# Patient Record
Sex: FEMALE | Race: WHITE | NOT HISPANIC OR LATINO
[De-identification: names, ages, dates, MRNs, and addresses within clinical notes are randomized per-mention and may not be internally consistent; named-entity substitution may affect disease eponyms.]

---

## 2017-03-06 ENCOUNTER — TRANSCRIPTION ENCOUNTER (OUTPATIENT)
Age: 42
End: 2017-03-06

## 2017-07-08 ENCOUNTER — TRANSCRIPTION ENCOUNTER (OUTPATIENT)
Age: 42
End: 2017-07-08

## 2021-03-30 ENCOUNTER — OUTPATIENT (OUTPATIENT)
Dept: OUTPATIENT SERVICES | Facility: HOSPITAL | Age: 46
LOS: 1 days | End: 2021-03-30
Payer: COMMERCIAL

## 2021-03-30 DIAGNOSIS — Z20.828 CONTACT WITH AND (SUSPECTED) EXPOSURE TO OTHER VIRAL COMMUNICABLE DISEASES: ICD-10-CM

## 2021-03-30 LAB — SARS-COV-2 RNA SPEC QL NAA+PROBE: SIGNIFICANT CHANGE UP

## 2021-03-30 PROCEDURE — U0003: CPT

## 2021-03-30 PROCEDURE — U0005: CPT

## 2021-03-30 PROCEDURE — C9803: CPT

## 2021-03-31 DIAGNOSIS — Z20.828 CONTACT WITH AND (SUSPECTED) EXPOSURE TO OTHER VIRAL COMMUNICABLE DISEASES: ICD-10-CM

## 2021-08-04 ENCOUNTER — TRANSCRIPTION ENCOUNTER (OUTPATIENT)
Age: 46
End: 2021-08-04

## 2021-11-01 ENCOUNTER — INPATIENT (INPATIENT)
Facility: HOSPITAL | Age: 46
LOS: 3 days | Discharge: HOME | DRG: 897 | End: 2021-11-05
Attending: INTERNAL MEDICINE | Admitting: INTERNAL MEDICINE
Payer: COMMERCIAL

## 2021-11-01 VITALS
SYSTOLIC BLOOD PRESSURE: 147 MMHG | TEMPERATURE: 98 F | RESPIRATION RATE: 20 BRPM | HEART RATE: 116 BPM | HEIGHT: 68 IN | DIASTOLIC BLOOD PRESSURE: 87 MMHG | WEIGHT: 134.92 LBS | OXYGEN SATURATION: 98 %

## 2021-11-01 DIAGNOSIS — F10.239 ALCOHOL DEPENDENCE WITH WITHDRAWAL, UNSPECIFIED: ICD-10-CM

## 2021-11-01 LAB
ALBUMIN SERPL ELPH-MCNC: 4 G/DL — SIGNIFICANT CHANGE UP (ref 3.3–5)
ALP SERPL-CCNC: 130 U/L — HIGH (ref 30–120)
ALT FLD-CCNC: 92 U/L DA — HIGH (ref 10–60)
ANION GAP SERPL CALC-SCNC: 15 MMOL/L — SIGNIFICANT CHANGE UP (ref 5–17)
ANISOCYTOSIS BLD QL: SLIGHT — SIGNIFICANT CHANGE UP
APTT BLD: 27.3 SEC — LOW (ref 27.5–35.5)
AST SERPL-CCNC: 184 U/L — HIGH (ref 10–40)
BASOPHILS # BLD AUTO: 0.05 K/UL — SIGNIFICANT CHANGE UP (ref 0–0.2)
BASOPHILS NFR BLD AUTO: 0.7 % — SIGNIFICANT CHANGE UP (ref 0–2)
BILIRUB SERPL-MCNC: 1.2 MG/DL — SIGNIFICANT CHANGE UP (ref 0.2–1.2)
BUN SERPL-MCNC: 11 MG/DL — SIGNIFICANT CHANGE UP (ref 7–23)
CALCIUM SERPL-MCNC: 8.8 MG/DL — SIGNIFICANT CHANGE UP (ref 8.4–10.5)
CHLORIDE SERPL-SCNC: 93 MMOL/L — LOW (ref 96–108)
CO2 SERPL-SCNC: 24 MMOL/L — SIGNIFICANT CHANGE UP (ref 22–31)
CREAT SERPL-MCNC: 0.62 MG/DL — SIGNIFICANT CHANGE UP (ref 0.5–1.3)
EOSINOPHIL # BLD AUTO: 0.06 K/UL — SIGNIFICANT CHANGE UP (ref 0–0.5)
EOSINOPHIL NFR BLD AUTO: 0.9 % — SIGNIFICANT CHANGE UP (ref 0–6)
ETHANOL SERPL-MCNC: <3 MG/DL — SIGNIFICANT CHANGE UP (ref 0–3)
GLUCOSE SERPL-MCNC: 125 MG/DL — HIGH (ref 70–99)
HCG SERPL-ACNC: <1 MIU/ML — SIGNIFICANT CHANGE UP
HCT VFR BLD CALC: 34.2 % — LOW (ref 34.5–45)
HGB BLD-MCNC: 11.6 G/DL — SIGNIFICANT CHANGE UP (ref 11.5–15.5)
IMM GRANULOCYTES NFR BLD AUTO: 0.4 % — SIGNIFICANT CHANGE UP (ref 0–1.5)
INR BLD: 1 RATIO — SIGNIFICANT CHANGE UP (ref 0.88–1.16)
LACTATE SERPL-SCNC: 0.5 MMOL/L — LOW (ref 0.7–2)
LIDOCAIN IGE QN: 254 U/L — SIGNIFICANT CHANGE UP (ref 73–393)
LYMPHOCYTES # BLD AUTO: 0.7 K/UL — LOW (ref 1–3.3)
LYMPHOCYTES # BLD AUTO: 10.2 % — LOW (ref 13–44)
MCHC RBC-ENTMCNC: 33.9 GM/DL — SIGNIFICANT CHANGE UP (ref 32–36)
MCHC RBC-ENTMCNC: 36.7 PG — HIGH (ref 27–34)
MCV RBC AUTO: 108.2 FL — HIGH (ref 80–100)
MONOCYTES # BLD AUTO: 0.73 K/UL — SIGNIFICANT CHANGE UP (ref 0–0.9)
MONOCYTES NFR BLD AUTO: 10.6 % — SIGNIFICANT CHANGE UP (ref 2–14)
NEUTROPHILS # BLD AUTO: 5.32 K/UL — SIGNIFICANT CHANGE UP (ref 1.8–7.4)
NEUTROPHILS NFR BLD AUTO: 77.2 % — HIGH (ref 43–77)
NRBC # BLD: 0 /100 WBCS — SIGNIFICANT CHANGE UP (ref 0–0)
PLAT MORPH BLD: NORMAL — SIGNIFICANT CHANGE UP
PLATELET # BLD AUTO: 63 K/UL — LOW (ref 150–400)
POLYCHROMASIA BLD QL SMEAR: SLIGHT — SIGNIFICANT CHANGE UP
POTASSIUM SERPL-MCNC: 2.9 MMOL/L — CRITICAL LOW (ref 3.5–5.3)
POTASSIUM SERPL-SCNC: 2.9 MMOL/L — CRITICAL LOW (ref 3.5–5.3)
PROT SERPL-MCNC: 8.3 G/DL — SIGNIFICANT CHANGE UP (ref 6–8.3)
PROTHROM AB SERPL-ACNC: 12.1 SEC — SIGNIFICANT CHANGE UP (ref 10.6–13.6)
RBC # BLD: 3.16 M/UL — LOW (ref 3.8–5.2)
RBC # FLD: 13 % — SIGNIFICANT CHANGE UP (ref 10.3–14.5)
RBC BLD AUTO: ABNORMAL
SARS-COV-2 RNA SPEC QL NAA+PROBE: SIGNIFICANT CHANGE UP
SODIUM SERPL-SCNC: 132 MMOL/L — LOW (ref 135–145)
STOMATOCYTES BLD QL SMEAR: SIGNIFICANT CHANGE UP
WBC # BLD: 6.89 K/UL — SIGNIFICANT CHANGE UP (ref 3.8–10.5)
WBC # FLD AUTO: 6.89 K/UL — SIGNIFICANT CHANGE UP (ref 3.8–10.5)

## 2021-11-01 PROCEDURE — 70450 CT HEAD/BRAIN W/O DYE: CPT | Mod: 26,MA

## 2021-11-01 PROCEDURE — 93010 ELECTROCARDIOGRAM REPORT: CPT

## 2021-11-01 PROCEDURE — 99285 EMERGENCY DEPT VISIT HI MDM: CPT

## 2021-11-01 PROCEDURE — 99223 1ST HOSP IP/OBS HIGH 75: CPT

## 2021-11-01 RX ORDER — METOPROLOL TARTRATE 50 MG
12.5 TABLET ORAL
Refills: 0 | Status: DISCONTINUED | OUTPATIENT
Start: 2021-11-02 | End: 2021-11-05

## 2021-11-01 RX ORDER — POTASSIUM PHOSPHATE, MONOBASIC POTASSIUM PHOSPHATE, DIBASIC 236; 224 MG/ML; MG/ML
30 INJECTION, SOLUTION INTRAVENOUS ONCE
Refills: 0 | Status: COMPLETED | OUTPATIENT
Start: 2021-11-02 | End: 2021-11-02

## 2021-11-01 RX ORDER — DIAZEPAM 5 MG
10 TABLET ORAL
Refills: 0 | Status: DISCONTINUED | OUTPATIENT
Start: 2021-11-01 | End: 2021-11-02

## 2021-11-01 RX ORDER — MAGNESIUM SULFATE 500 MG/ML
2 VIAL (ML) INJECTION ONCE
Refills: 0 | Status: COMPLETED | OUTPATIENT
Start: 2021-11-01 | End: 2021-11-01

## 2021-11-01 RX ORDER — DIAZEPAM 5 MG
10 TABLET ORAL ONCE
Refills: 0 | Status: DISCONTINUED | OUTPATIENT
Start: 2021-11-01 | End: 2021-11-01

## 2021-11-01 RX ORDER — ENOXAPARIN SODIUM 100 MG/ML
40 INJECTION SUBCUTANEOUS DAILY
Refills: 0 | Status: DISCONTINUED | OUTPATIENT
Start: 2021-11-01 | End: 2021-11-05

## 2021-11-01 RX ORDER — PANTOPRAZOLE SODIUM 20 MG/1
40 TABLET, DELAYED RELEASE ORAL DAILY
Refills: 0 | Status: DISCONTINUED | OUTPATIENT
Start: 2021-11-01 | End: 2021-11-05

## 2021-11-01 RX ORDER — CHLORHEXIDINE GLUCONATE 213 G/1000ML
1 SOLUTION TOPICAL
Refills: 0 | Status: DISCONTINUED | OUTPATIENT
Start: 2021-11-01 | End: 2021-11-03

## 2021-11-01 RX ORDER — DIAZEPAM 5 MG
10 TABLET ORAL EVERY 6 HOURS
Refills: 0 | Status: DISCONTINUED | OUTPATIENT
Start: 2021-11-02 | End: 2021-11-02

## 2021-11-01 RX ORDER — THIAMINE MONONITRATE (VIT B1) 100 MG
500 TABLET ORAL DAILY
Refills: 0 | Status: COMPLETED | OUTPATIENT
Start: 2021-11-01 | End: 2021-11-04

## 2021-11-01 RX ORDER — POTASSIUM CHLORIDE 20 MEQ
40 PACKET (EA) ORAL ONCE
Refills: 0 | Status: COMPLETED | OUTPATIENT
Start: 2021-11-01 | End: 2021-11-01

## 2021-11-01 RX ORDER — SODIUM CHLORIDE 9 MG/ML
1000 INJECTION INTRAMUSCULAR; INTRAVENOUS; SUBCUTANEOUS ONCE
Refills: 0 | Status: COMPLETED | OUTPATIENT
Start: 2021-11-01 | End: 2021-11-01

## 2021-11-01 RX ORDER — FOLIC ACID 0.8 MG
1 TABLET ORAL DAILY
Refills: 0 | Status: DISCONTINUED | OUTPATIENT
Start: 2021-11-01 | End: 2021-11-05

## 2021-11-01 RX ORDER — ESCITALOPRAM OXALATE 10 MG/1
5 TABLET, FILM COATED ORAL DAILY
Refills: 0 | Status: DISCONTINUED | OUTPATIENT
Start: 2021-11-02 | End: 2021-11-02

## 2021-11-01 RX ADMIN — Medication 1 MILLIGRAM(S): at 19:37

## 2021-11-01 RX ADMIN — Medication 1 MILLIGRAM(S): at 21:02

## 2021-11-01 RX ADMIN — SODIUM CHLORIDE 1000 MILLILITER(S): 9 INJECTION INTRAMUSCULAR; INTRAVENOUS; SUBCUTANEOUS at 20:37

## 2021-11-01 RX ADMIN — Medication 10 MILLIGRAM(S): at 22:54

## 2021-11-01 RX ADMIN — Medication 40 MILLIEQUIVALENT(S): at 21:02

## 2021-11-01 RX ADMIN — Medication 50 MILLIGRAM(S): at 19:37

## 2021-11-01 RX ADMIN — SODIUM CHLORIDE 1000 MILLILITER(S): 9 INJECTION INTRAMUSCULAR; INTRAVENOUS; SUBCUTANEOUS at 19:37

## 2021-11-01 NOTE — H&P ADULT - NSHPPHYSICALEXAM_GEN_ALL_CORE
-  Vital Signs Last 24 Hrs  T(C): 36.7 (02 Nov 2021 00:00), Max: 36.7 (01 Nov 2021 17:30)  T(F): 98 (02 Nov 2021 00:00), Max: 98 (01 Nov 2021 17:30)  HR: 99 (02 Nov 2021 00:00) (95 - 116)  BP: 105/83 (02 Nov 2021 00:00) (105/83 - 147/87)  BP(mean): 89 (02 Nov 2021 00:00) (89 - 89)  RR: 11 (02 Nov 2021 00:00) (11 - 22)  SpO2: 99% (02 Nov 2021 00:00) (98% - 99%)          PHYSICAL EXAM:  		  GENERAL: NAD, well-groomed, well-developed.  HEAD:  Atraumatic, Norm cephalic, small scalp swelling.  EYES: PERRLA, conjunctiva clear.  ENMT: no nasal discharge, MMM.   NECK: Supple, No JVD.  NERVOUS SYSTEM:  Alert & oriented X3, neurologically intact grossly, (+) marked B/L coarse hand tremors, even unable to deal with her cell phone.  CHEST/LUNG: Good air entry B/L, no rales, rhonchi, or wheezing.  HEART: Normal S1 & S2, no murmurs, or extra sounds.  ABDOMEN: Soft, non-tender, non-distended; bowel sounds present, no palpable masses or organomegaly, (+) scar of previpus surgery.  EXTREMITIES:  No clubbing, cyanosis, or edema.  VASCULAR: 2+ radial, DPA / PTA pulses B/L.  SKIN: No rashes or lesions.  PSYCH: normal affect & behavior.

## 2021-11-01 NOTE — ED ADULT NURSE NOTE - OBJECTIVE STATEMENT
Pt's  states he witnessed his wife fall to ground & had clenched her jaw & hands & had tremors. No hx of seizures. pt doesn't recall the event- thought she tripped & fell. Pt A & O

## 2021-11-01 NOTE — H&P ADULT - NSHPREVIEWOFSYSTEMS_GEN_ALL_CORE
-    CONSTITUTIONAL: No fever or chills.  EYES: No eye pain, visual disturbances, or discharge.  ENMT:  No difficulty hearing, vertigo, sinus or throat pain.  NECK: No pain or stiffness.	  RESPIRATORY: No cough, wheezing, or hemoptysis; No shortness of breath.  CARDIOVASCULAR: No chest pain, palpitations, dizziness, or leg swelling.  GASTROINTESTINAL: No abdominal pain, no nausea, vomiting, or hematemesis; No diarrhea or Change in bowel habits. No melena or hematochezia.  GENITOURINARY: No dysuria, frequency, hematuria, or incontinence.  NEUROLOGICAL: No headaches, focal muscle weakness, or numbness.  SKIN: No itching, burning or rashes.  MUSCULOSKELETAL: No joint swelling or pain.  PSYCHIATRIC: No depression, anxiety, or agitation.  HEME/LYMPH: No easy bruising, bleeding gums, or nose bleed.  ALLERGY AND IMMUNOLOGIC: No hives or eczema.
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
## 2021-11-01 NOTE — H&P ADULT - NSHPLABSRESULTS_GEN_ALL_CORE
-      Labs:    Lactate Trend  11-01 @ 19:33 Lactate:0.5                           11.6   6.89  )-----------( 63       ( 01 Nov 2021 20:04 )             34.2            11-01    132<L>  |  93<L>  |  11  ----------------------------<  125<H>  2.9<LL>   |  24  |  0.62    Ca    8.8      01 Nov 2021 20:00    TPro  8.3  /  Alb  4.0  /  TBili  1.2  /  DBili  x   /  AST  184<H>  /  ALT  92<H>  /  AlkPhos  130<H>  11-01            PT/INR - ( 01 Nov 2021 20:04 )   PT: 12.1 sec;   INR: 1.00 ratio         PTT - ( 01 Nov 2021 20:04 )  PTT:27.3 sec -    Lactate Trend  11-01 @ 19:33 Lactate:0.5                           11.6   6.89  )-----------( 63       ( 01 Nov 2021 20:04 )             34.2            11-01    132<L>  |  93<L>  |  11  ----------------------------<  125<H>  2.9<LL>   |  24  |  0.62    Ca    8.8      01 Nov 2021 20:00    TPro  8.3  /  Alb  4.0  /  TBili  1.2  /  DBili  x   /  AST  184<H>  /  ALT  92<H>  /  AlkPhos  130<H>  11-01            PT/INR - ( 01 Nov 2021 20:04 )   PT: 12.1 sec;   INR: 1.00 ratio    PTT - ( 01 Nov 2021 20:04 )  PTT:27.3 sec      COVID-19 PCR: NotDetec (01 Nov 2021 19:40)    Lipase, Serum (11.01.21 @ 20:00)   Lipase, Serum: 254 U/L     HCG Quantitative, Serum: <1.    Alcohol, Blood: <3: [employer] (mg/dL).          CT BRAIN:                      PROCEDURE DATE:  11/01/2021    INTERPRETATION:  Exam Date: 11/1/2021 8:26 PM  CT head without IV contrast  CLINICAL INFORMATION:  seizure  TECHNIQUE: Contiguous axial sections were obtained through the head.   Coronal and sagittal reformats were obtained.  COMPARISON: No previous examinations are available for review.  FINDINGS:  There is no evidence of intraparenchymal or extraaxial hemorrhage.   There is no CT evidence of large vessel acute infarct. No mass effect is found in the brain.  No evidence of midline shift or herniation pattern.  The ventricles, sulci and basal cisterns appear unremarkable.  Visualized paranasal sinuses are clear.  IMPRESSION:  No acute intracranial findings.  Personally reviewed by me.        EKG:    As per my review shows SR at 100/min, normal MT & prolonged QTc intervals, normal QRS voltage, duration, and axis (zero), with normal transition, no ST-T abnormality.      -

## 2021-11-01 NOTE — ED ADULT TRIAGE NOTE - CHIEF COMPLAINT QUOTE
As per , " We were at our son's soccer game - she had a seizure " Pt no known history of seizure Pt unable to recall seizure event stated that she tripped and fell . Pt BIBA Awake and verbally responsive

## 2021-11-01 NOTE — ED ADULT TRIAGE NOTE - CODE STROKE ACTIVE YN
----- Message from Katelynn Archer sent at 5/22/2020 10:03 AM CDT -----  Contact: pt  Please call back [phone]   No

## 2021-11-01 NOTE — ED PROVIDER NOTE - NS ED ROS FT

## 2021-11-01 NOTE — CONSULT NOTE ADULT - SUBJECTIVE AND OBJECTIVE BOX
REASON FOR CONSULT: Seizure, ETOHism    Chief Complaint    HPI: 45y/o female with PMH of HTN, depression/anxiety, etoh abuse (wine 3-5 drinks per day) presents to SY with seizure. She was at her sons football same when she collapsed and started shaking per her . Patient does not remember the event. This has never happened before. Admits to drinking 3-5 glasses of wine per day for at least 3 years. Started after her father got sick. No smoking or other drug use. No allergies.    Currently stable but tremulous.  Case discussed with the ICU PA    PAST MEDICAL & SURGICAL HISTORY:  HTN (hypertension)  HTN (hypertension)  Anxiety      Allergies  No Known Allergies      Medications:  diazepam  Injectable 10 milliGRAM(s) IV Push Once  diazepam  Injectable 10 milliGRAM(s) IV Push every 1 hour PRN  enoxaparin Injectable 40 milliGRAM(s) SubCutaneous daily  pantoprazole  Injectable 40 milliGRAM(s) IV Push daily  folic acid 1 milliGRAM(s) Oral daily  magnesium sulfate  IVPB 2 Gram(s) IV Intermittent Once  multivitamin 1 Tablet(s) Oral daily  potassium chloride    Tablet ER 40 milliEquivalent(s) Oral once  thiamine IVPB 500 milliGRAM(s) IV Intermittent daily  chlorhexidine 2% Cloths 1 Application(s) Topical <User Schedule>      Vital Signs Last 24 Hrs  T(C): 36.7 (01 Nov 2021 17:30), Max: 36.7 (01 Nov 2021 17:30)  T(F): 98 (01 Nov 2021 17:30), Max: 98 (01 Nov 2021 17:30)  HR: 99 (01 Nov 2021 21:17) (99 - 116)  BP: 127/77 (01 Nov 2021 21:17) (127/77 - 147/87)  RR: 16 (01 Nov 2021 21:17) (16 - 20)  SpO2: 98% (01 Nov 2021 21:17) (98% - 99%)        LABS:                        11.6   6.89  )-----------( 63       ( 01 Nov 2021 20:04 )             34.2     11-01    132<L>  |  93<L>  |  11  ----------------------------<  125<H>  2.9<LL>   |  24  |  0.62    Ca    8.8      01 Nov 2021 20:00    TPro  8.3  /  Alb  4.0  /  TBili  1.2  /  DBili  x   /  AST  184<H>  /  ALT  92<H>  /  AlkPhos  130<H>  11-01        PT/INR - ( 01 Nov 2021 20:04 )   PT: 12.1 sec;   INR: 1.00 ratio         PTT - ( 01 Nov 2021 20:04 )  PTT:27.3 sec      Physical Examination:  Physical exam as per bedside MD (direct physical examination could not be performed because the visit was provided via Telemedicine):       RADIOLOGY: < from: CT Head No Cont (11.01.21 @ 20:26) >  IMPRESSION:    No acute intracranial findings.    < end of copied text >      IMP-  Acute seizure  ETOH withdrawal    Plan-  Admit to ICU  CIWA protocol    CRITICAL CARE TIME SPENT: 40    This visit was provided via telemedicine. Patient was located at     [facility].  Provider was located at [facility] [place], [place] for the visit.

## 2021-11-01 NOTE — H&P ADULT - PROBLEM SELECTOR PLAN 1
new onset, r/o ETOH withdrawal, but no reported change in drinking pattern, no history of head trauma, CT head without contrast was unremarkable, admitted to [facility], currently on Benzodiazepines for ETOH detox, hold her Escitalopram, seizure precautions, neurology consult with Dr. Fregoso was called.

## 2021-11-01 NOTE — H&P ADULT - NSHPSOCIALHISTORY_GEN_ALL_CORE
-    , lives with family, working in spa, non smoker, drinks about 4 wine drinks every night, no drug abuse.

## 2021-11-01 NOTE — H&P ADULT - PROBLEM SELECTOR PLAN 4
mild, ML 2ry to Escitalopram use and ETOH abuse, unlikely to be the cause of her seizure given the level & lack of acuity, held Escitalopram already because of the seizureode, monitor sodium level. mild, ML 2ry to Escitalopram use and ETOH abuse, unlikely to be the cause of her seizure given the level & lack of acuity, held Escitalopram already because of the seizure episode, monitor sodium level.

## 2021-11-01 NOTE — H&P ADULT - PROBLEM SELECTOR PLAN 6
again related to ETOH abuse, no evidence suggestive of liver failure, will get an abdominal U/S in am.

## 2021-11-01 NOTE — H&P ADULT - PROBLEM SELECTOR PLAN 7
was started on LMWH 40 mg sub Q daily for DVT prophylaxis, also Pantoprazole 40 mg IVP daily for stress ulcer prophylaxis.

## 2021-11-01 NOTE — CONSULT NOTE ADULT - SUBJECTIVE AND OBJECTIVE BOX
Patient is a 46y old  Female who presents with a chief complaint of Seizure    HPI: 47y/o female with PMH of HTN, depression/anxiety, etoh abuse (wine 3-5 drinks per day) presents to SY with seizure. She was at her sons football same when she collapsed and started shaking per her . Patient does not remember the event. This has never happened before. Admits to drinking 3-5 glasses of wine per day for at least 3 years. Started after her father got sick. No smoking or other drug use. No allergies.      PAST MEDICAL & SURGICAL HISTORY:  HTN (hypertension)    HTN (hypertension)    Anxiety        Review of Systems:  CONSTITUTIONAL: No fever, chills, or fatigue  EYES: No eye pain, visual disturbances, or discharge  ENMT:  No difficulty hearing, tinnitus, vertigo; No sinus or throat pain  NECK: No pain or stiffness  RESPIRATORY: No cough, wheezing, chills or hemoptysis; No shortness of breath  CARDIOVASCULAR: No chest pain, palpitations, dizziness, or leg swelling  GASTROINTESTINAL: No abdominal or epigastric pain. No nausea, vomiting, or hematemesis; No diarrhea or constipation. No melena or hematochezia.  GENITOURINARY: No dysuria, frequency, hematuria, or incontinence  NEUROLOGICAL: No headaches, memory loss, loss of strength, numbness, + tremors  SKIN: No itching, burning, rashes, or lesions   MUSCULOSKELETAL: No joint pain or swelling; No muscle, back, or extremity pain  PSYCHIATRIC: No depression, anxiety, mood swings, or difficulty sleeping      Medications:        diazepam  Injectable 10 milliGRAM(s) IV Push Once  diazepam  Injectable 10 milliGRAM(s) IV Push every 1 hour PRN      enoxaparin Injectable 40 milliGRAM(s) SubCutaneous daily    pantoprazole  Injectable 40 milliGRAM(s) IV Push daily        folic acid 1 milliGRAM(s) Oral daily  magnesium sulfate  IVPB 2 Gram(s) IV Intermittent Once  multivitamin 1 Tablet(s) Oral daily  potassium chloride    Tablet ER 40 milliEquivalent(s) Oral once  thiamine IVPB 500 milliGRAM(s) IV Intermittent daily      chlorhexidine 2% Cloths 1 Application(s) Topical <User Schedule>            ICU Vital Signs Last 24 Hrs  T(C): 36.7 (01 Nov 2021 17:30), Max: 36.7 (01 Nov 2021 17:30)  T(F): 98 (01 Nov 2021 17:30), Max: 98 (01 Nov 2021 17:30)  HR: 99 (01 Nov 2021 21:17) (99 - 116)  BP: 127/77 (01 Nov 2021 21:17) (127/77 - 147/87)  BP(mean): --  ABP: --  ABP(mean): --  RR: 16 (01 Nov 2021 21:17) (16 - 20)  SpO2: 98% (01 Nov 2021 21:17) (98% - 99%)          I&O's Detail        LABS:                        11.6   6.89  )-----------( 63       ( 01 Nov 2021 20:04 )             34.2     11-01    132<L>  |  93<L>  |  11  ----------------------------<  125<H>  2.9<LL>   |  24  |  0.62    Ca    8.8      01 Nov 2021 20:00    TPro  8.3  /  Alb  4.0  /  TBili  1.2  /  DBili  x   /  AST  184<H>  /  ALT  92<H>  /  AlkPhos  130<H>  11-01          CAPILLARY BLOOD GLUCOSE        PT/INR - ( 01 Nov 2021 20:04 )   PT: 12.1 sec;   INR: 1.00 ratio         PTT - ( 01 Nov 2021 20:04 )  PTT:27.3 sec    CULTURES:      Physical Examination:    General: Alert, oriented, interactive, nonfocal, anxious, jittery     HEENT: Pupils equal, reactive to light.  Symmetric.    PULM: Clear to auscultation bilaterally, no significant sputum production    CVS: tachycardic rate and regular rhythm, no murmurs, rubs, or gallops    ABD: Soft, nondistended, nontender, normoactive bowel sounds    EXT: No edema, nontender, + hand tremors     SKIN: Warm and well perfused    NEURO: A&Ox3, strength 5/5 all extremities, cranial nerves grossly intact, no focal deficits      RADIOLOGY: EXAM:  CT BRAIN                                  PROCEDURE DATE:  11/01/2021          INTERPRETATION:  Exam Date: 11/1/2021 8:26 PM    CT head without IV contrast    CLINICAL INFORMATION:  seizure    TECHNIQUE: Contiguous axial sections were obtained through the head.   Coronal and sagittal reformats were obtained.    COMPARISON: No previous examinations are available for review.    FINDINGS:    There is no evidence of intraparenchymal or extraaxial hemorrhage.   There is no CT evidence of large vessel acute infarct. No mass effect is found in the brain.  No evidence of midline shift or herniation pattern.    The ventricles, sulci and basal cisterns appear unremarkable.    Visualized paranasal sinuses are clear.    IMPRESSION:    No acute intracranial findings.    --- End of Report ---        SONI HILL MD; Attending Radiologist  This document has been electronically signed. Nov 1 2021  8:27PM

## 2021-11-01 NOTE — ED PROVIDER NOTE - PHYSICAL EXAMINATION
Gen: Alert, NAD +hand tremors  Head/eyes: NC/AT, PERRL, EOMI, normal lids/conjunctiva, no scleral icterus  ENT: airway patent + tongue fasciculations, no tongue bite,   Neck: supple, no tenderness/meningismus/JVD, Trachea midline  Pulm/lung: Bilateral clear BS, normal resp effort, no wheeze/stridor/retractions  CV/heart: RRR, no M/R/G, +2 dist pulses (radial, pedal DP/PT, popliteal)  GI/Abd: soft, NT/ND, +BS, no guarding/rebound tenderness  Musculoskeletal: no edema/erythema/cyanosis, FROM in all extremities, no C/T/L spine TTP   Skin: no rash, no vesicles, no petechaie, no ecchymosis, no swelling  Neuro: AAOx3, CN 2-12 intact, normal sensation, 5/5 motor strength in all extremities, normal gait, no dysmetria

## 2021-11-01 NOTE — ED PROVIDER NOTE - CLINICAL SUMMARY MEDICAL DECISION MAKING FREE TEXT BOX
First time seizure today w/ hx of everyday alcohol use, suspect alcohol withdrawal seizures. will check lab CT head IV benzo and admit.

## 2021-11-01 NOTE — CONSULT NOTE ADULT - ASSESSMENT
47y/o female with PMH of HTN, depression/anxiety, etoh abuse (wine 3-5 drinks per day) presents to SY with seizure. Most likely etoh withdrawal related. Patient wants to go through detox. Will admit to SPCU for detox given she has already had a seizure and will need aggressive benzo and possible phenobarb therapy.     Plan discussed with E- ICU attending Dr. Salas    Problem List:  1) Etoh withdrawal with DTs and seizure  2) Hypokalemia    Admit to SPCU  Start on valium for withdrawal and detox, got 2mg ativan IV in ER, will use valium 10mg IVP now followed by q6 hours. Valium 10mg IVP PRn CIWA > 8. Assess response and dose more frequently if needed.   Patient is interested in dextox and getting help afterwards  Replace potassium with 40meq now, also give 30mmol K phos and 2g magnesium  Place on MVI, thiamine, and folic acid, high dose thiamine daily for 3 days  Regular diet, encourage PO intake   Given her length and quanity of alcohol intake suspect she will need aggressive treatment especially as time goes on in the next day or so, will be liberal with valium dosing, it is better to load these patient's upfront then to try to catch up later  Valium 10mg IVP now to control symptoms  Precedex if needed as well

## 2021-11-01 NOTE — ED ADULT NURSE NOTE - NSIMPLEMENTINTERV_GEN_ALL_ED
Implemented All Fall with Harm Risk Interventions:  [place] to call system. Call bell, personal items and telephone within reach. Instruct patient to call for assistance. Room bathroom lighting operational. Non-slip footwear when patient is off stretcher. Physically safe environment: no spills, clutter or unnecessary equipment. Stretcher in lowest position, wheels locked, appropriate side rails in place. Provide visual cue, wrist band, yellow gown, etc. Monitor gait and stability. Monitor for mental status changes and reorient to person, place, and time. Review medications for side effects contributing to fall risk. Reinforce activity limits and safety measures with patient and family. Provide visual clues: red socks.

## 2021-11-01 NOTE — H&P ADULT - HISTORY OF PRESENT ILLNESS
This is a 45 y/o F with PMH of HTN, ETOH abuse, and Anxiety who presented with a seizure episode witnessed by her  who reported seeing her falling to the ground with her jaw clenched, and hands shacking, then she recovered spontaneously after 5 min, no vomiting, or incontinence during the episode. Patient stated that she was "looking at the arnaud" and fell down backwards hitting her head. No history of seizures in the past. Patient with history of ETOH abuse, drinking every night, with her last drink was last night. On arrival to ED she was AAO X3, but with marked tremors, received Diazepam 10 mg IVP X1 dose, Ativan 1 mg IVP X2 doses, and Chlordiazepoxide 50 mg PO X1 dose, and was admitted to SPCU for ETOH detox.

## 2021-11-01 NOTE — H&P ADULT - PROBLEM SELECTOR PLAN 2
requested ETOH detox, admitted to SPCU, was started on CIWA protocol with IVP diazepam, CC/addiction medicine consult with Dr. Akhtar was called. requested ETOH detox, admitted to SPCU, was started on CIWA protocol with IVP diazepam, thiamin & folic acid, monitor magnesium & phosphorous, CC/addiction medicine consult with Dr. Akhtar was called.

## 2021-11-01 NOTE — H&P ADULT - PROBLEM SELECTOR PLAN 5
Ml related to ETOH abuse, patient is willing to quit and requested detox, was started on Folic acid supplementation, monitor.

## 2021-11-02 DIAGNOSIS — I10 ESSENTIAL (PRIMARY) HYPERTENSION: ICD-10-CM

## 2021-11-02 DIAGNOSIS — E87.1 HYPO-OSMOLALITY AND HYPONATREMIA: ICD-10-CM

## 2021-11-02 DIAGNOSIS — R94.5 ABNORMAL RESULTS OF LIVER FUNCTION STUDIES: ICD-10-CM

## 2021-11-02 DIAGNOSIS — D75.89 OTHER SPECIFIED DISEASES OF BLOOD AND BLOOD-FORMING ORGANS: ICD-10-CM

## 2021-11-02 DIAGNOSIS — R56.9 UNSPECIFIED CONVULSIONS: ICD-10-CM

## 2021-11-02 DIAGNOSIS — E87.6 HYPOKALEMIA: ICD-10-CM

## 2021-11-02 DIAGNOSIS — Z29.9 ENCOUNTER FOR PROPHYLACTIC MEASURES, UNSPECIFIED: ICD-10-CM

## 2021-11-02 DIAGNOSIS — F10.10 ALCOHOL ABUSE, UNCOMPLICATED: ICD-10-CM

## 2021-11-02 DIAGNOSIS — Z98.891 HISTORY OF UTERINE SCAR FROM PREVIOUS SURGERY: Chronic | ICD-10-CM

## 2021-11-02 DIAGNOSIS — R19.7 DIARRHEA, UNSPECIFIED: ICD-10-CM

## 2021-11-02 LAB
ALBUMIN SERPL ELPH-MCNC: 3.1 G/DL — LOW (ref 3.3–5)
ALP SERPL-CCNC: 102 U/L — SIGNIFICANT CHANGE UP (ref 30–120)
ALT FLD-CCNC: 68 U/L DA — HIGH (ref 10–60)
AMMONIA BLD-MCNC: 43 UMOL/L — HIGH (ref 11–32)
ANION GAP SERPL CALC-SCNC: 8 MMOL/L — SIGNIFICANT CHANGE UP (ref 5–17)
AST SERPL-CCNC: 124 U/L — HIGH (ref 10–40)
BASOPHILS # BLD AUTO: 0.03 K/UL — SIGNIFICANT CHANGE UP (ref 0–0.2)
BASOPHILS NFR BLD AUTO: 0.5 % — SIGNIFICANT CHANGE UP (ref 0–2)
BILIRUB SERPL-MCNC: 1 MG/DL — SIGNIFICANT CHANGE UP (ref 0.2–1.2)
BUN SERPL-MCNC: 7 MG/DL — SIGNIFICANT CHANGE UP (ref 7–23)
CALCIUM SERPL-MCNC: 8.3 MG/DL — LOW (ref 8.4–10.5)
CHLORIDE SERPL-SCNC: 101 MMOL/L — SIGNIFICANT CHANGE UP (ref 96–108)
CK SERPL-CCNC: 136 U/L — SIGNIFICANT CHANGE UP (ref 26–192)
CO2 SERPL-SCNC: 25 MMOL/L — SIGNIFICANT CHANGE UP (ref 22–31)
COVID-19 SPIKE DOMAIN AB INTERP: NEGATIVE — SIGNIFICANT CHANGE UP
COVID-19 SPIKE DOMAIN ANTIBODY RESULT: 0.4 U/ML — SIGNIFICANT CHANGE UP
CREAT SERPL-MCNC: 0.51 MG/DL — SIGNIFICANT CHANGE UP (ref 0.5–1.3)
EOSINOPHIL # BLD AUTO: 0.08 K/UL — SIGNIFICANT CHANGE UP (ref 0–0.5)
EOSINOPHIL NFR BLD AUTO: 1.4 % — SIGNIFICANT CHANGE UP (ref 0–6)
GLUCOSE SERPL-MCNC: 94 MG/DL — SIGNIFICANT CHANGE UP (ref 70–99)
HCT VFR BLD CALC: 32.1 % — LOW (ref 34.5–45)
HGB BLD-MCNC: 10.8 G/DL — LOW (ref 11.5–15.5)
IMM GRANULOCYTES NFR BLD AUTO: 0.4 % — SIGNIFICANT CHANGE UP (ref 0–1.5)
LYMPHOCYTES # BLD AUTO: 0.81 K/UL — LOW (ref 1–3.3)
LYMPHOCYTES # BLD AUTO: 14.5 % — SIGNIFICANT CHANGE UP (ref 13–44)
MAGNESIUM SERPL-MCNC: 2 MG/DL — SIGNIFICANT CHANGE UP (ref 1.6–2.6)
MCHC RBC-ENTMCNC: 33.6 GM/DL — SIGNIFICANT CHANGE UP (ref 32–36)
MCHC RBC-ENTMCNC: 37.2 PG — HIGH (ref 27–34)
MCV RBC AUTO: 110.7 FL — HIGH (ref 80–100)
MONOCYTES # BLD AUTO: 0.66 K/UL — SIGNIFICANT CHANGE UP (ref 0–0.9)
MONOCYTES NFR BLD AUTO: 11.8 % — SIGNIFICANT CHANGE UP (ref 2–14)
NEUTROPHILS # BLD AUTO: 3.97 K/UL — SIGNIFICANT CHANGE UP (ref 1.8–7.4)
NEUTROPHILS NFR BLD AUTO: 71.4 % — SIGNIFICANT CHANGE UP (ref 43–77)
NRBC # BLD: 0 /100 WBCS — SIGNIFICANT CHANGE UP (ref 0–0)
PHOSPHATE SERPL-MCNC: 4.4 MG/DL — SIGNIFICANT CHANGE UP (ref 2.5–4.5)
PLATELET # BLD AUTO: 61 K/UL — LOW (ref 150–400)
POTASSIUM SERPL-MCNC: 4 MMOL/L — SIGNIFICANT CHANGE UP (ref 3.5–5.3)
POTASSIUM SERPL-SCNC: 4 MMOL/L — SIGNIFICANT CHANGE UP (ref 3.5–5.3)
PROT SERPL-MCNC: 6.9 G/DL — SIGNIFICANT CHANGE UP (ref 6–8.3)
RBC # BLD: 2.9 M/UL — LOW (ref 3.8–5.2)
RBC # FLD: 13.2 % — SIGNIFICANT CHANGE UP (ref 10.3–14.5)
SARS-COV-2 IGG+IGM SERPL QL IA: 0.4 U/ML — SIGNIFICANT CHANGE UP
SARS-COV-2 IGG+IGM SERPL QL IA: NEGATIVE — SIGNIFICANT CHANGE UP
SODIUM SERPL-SCNC: 134 MMOL/L — LOW (ref 135–145)
TSH SERPL-MCNC: 3.14 UIU/ML — SIGNIFICANT CHANGE UP (ref 0.27–4.2)
VIT B12 SERPL-MCNC: 1752 PG/ML — HIGH (ref 232–1245)
WBC # BLD: 5.57 K/UL — SIGNIFICANT CHANGE UP (ref 3.8–10.5)
WBC # FLD AUTO: 5.57 K/UL — SIGNIFICANT CHANGE UP (ref 3.8–10.5)

## 2021-11-02 PROCEDURE — 99233 SBSQ HOSP IP/OBS HIGH 50: CPT

## 2021-11-02 PROCEDURE — 76700 US EXAM ABDOM COMPLETE: CPT | Mod: 26

## 2021-11-02 PROCEDURE — 99231 SBSQ HOSP IP/OBS SF/LOW 25: CPT

## 2021-11-02 RX ORDER — DIAZEPAM 5 MG
5 TABLET ORAL EVERY 4 HOURS
Refills: 0 | Status: DISCONTINUED | OUTPATIENT
Start: 2021-11-02 | End: 2021-11-05

## 2021-11-02 RX ORDER — METOPROLOL TARTRATE 50 MG
25 TABLET ORAL
Qty: 0 | Refills: 0 | DISCHARGE

## 2021-11-02 RX ORDER — SERTRALINE 25 MG/1
50 TABLET, FILM COATED ORAL DAILY
Refills: 0 | Status: DISCONTINUED | OUTPATIENT
Start: 2021-11-02 | End: 2021-11-05

## 2021-11-02 RX ORDER — SERTRALINE 25 MG/1
1 TABLET, FILM COATED ORAL
Qty: 0 | Refills: 0 | DISCHARGE

## 2021-11-02 RX ORDER — DIAZEPAM 5 MG
5 TABLET ORAL EVERY 4 HOURS
Refills: 0 | Status: DISCONTINUED | OUTPATIENT
Start: 2021-11-02 | End: 2021-11-04

## 2021-11-02 RX ADMIN — Medication 5 MILLIGRAM(S): at 22:03

## 2021-11-02 RX ADMIN — Medication 1 MILLIGRAM(S): at 00:03

## 2021-11-02 RX ADMIN — ENOXAPARIN SODIUM 40 MILLIGRAM(S): 100 INJECTION SUBCUTANEOUS at 11:09

## 2021-11-02 RX ADMIN — Medication 12.5 MILLIGRAM(S): at 17:50

## 2021-11-02 RX ADMIN — Medication 5 MILLIGRAM(S): at 11:08

## 2021-11-02 RX ADMIN — PANTOPRAZOLE SODIUM 40 MILLIGRAM(S): 20 TABLET, DELAYED RELEASE ORAL at 11:09

## 2021-11-02 RX ADMIN — Medication 12.5 MILLIGRAM(S): at 06:02

## 2021-11-02 RX ADMIN — Medication 1 TABLET(S): at 11:09

## 2021-11-02 RX ADMIN — POTASSIUM PHOSPHATE, MONOBASIC POTASSIUM PHOSPHATE, DIBASIC 83.33 MILLIMOLE(S): 236; 224 INJECTION, SOLUTION INTRAVENOUS at 00:54

## 2021-11-02 RX ADMIN — Medication 1 TABLET(S): at 00:04

## 2021-11-02 RX ADMIN — ENOXAPARIN SODIUM 40 MILLIGRAM(S): 100 INJECTION SUBCUTANEOUS at 00:04

## 2021-11-02 RX ADMIN — Medication 50 GRAM(S): at 00:04

## 2021-11-02 RX ADMIN — Medication 40 MILLIEQUIVALENT(S): at 00:04

## 2021-11-02 RX ADMIN — Medication 5 MILLIGRAM(S): at 17:50

## 2021-11-02 RX ADMIN — Medication 10 MILLIGRAM(S): at 04:10

## 2021-11-02 RX ADMIN — Medication 5 MILLIGRAM(S): at 14:14

## 2021-11-02 RX ADMIN — Medication 10 MILLIGRAM(S): at 00:54

## 2021-11-02 RX ADMIN — Medication 105 MILLIGRAM(S): at 11:09

## 2021-11-02 RX ADMIN — Medication 1 MILLIGRAM(S): at 11:08

## 2021-11-02 RX ADMIN — CHLORHEXIDINE GLUCONATE 1 APPLICATION(S): 213 SOLUTION TOPICAL at 06:03

## 2021-11-02 RX ADMIN — Medication 10 MILLIGRAM(S): at 06:02

## 2021-11-02 NOTE — PROGRESS NOTE ADULT - ASSESSMENT
45 y/o F w/ a PMHx of HTN, depression/anxiety, and EtOH abuse (3-5 glasses of wine/day x 3 years) admitted w/:    1. EtOH withdrawal/DTs  2. Seizures  3. Hypokalemia, improved  4. Transaminitis    PLAN:  - No further seizure activity. Ativan IV PRN for seizures.  - Multivitamin, folic acid, and thiamine.  - Valium 5mg PO q4 hrs plus 5mg q4 hrs PRN for CIWA > 5 per addiction medicine attending recs.  - Transaminitis downtrending.  - Abdominal ultrasound revealing of fatty liver.  - Chemical DVT prophylaxis w/ lovenox.

## 2021-11-02 NOTE — BH CONSULTATION LIAISON ASSESSMENT NOTE - NSCDBILL_PSY_A_CORE
January 20, 2020      To Whom It May Concern:      Cathy Valadez Sukhialeshia was seen in our Emergency Department today, 01/20/20.    Sincerely,        Bipin Sykes, DO        
[id number] - Inpatient, moderate complexity

## 2021-11-02 NOTE — CONSULT NOTE ADULT - CONSULT REASON
etoh withdrawal seizure
Seizure, ETOH withdrawal.
Seizure  Alcohol withdrawal
etoh use  AMOR  seizures

## 2021-11-02 NOTE — PROGRESS NOTE ADULT - SUBJECTIVE AND OBJECTIVE BOX
Patient is a 45 y/o female who presents with a chief complaint of seizures (2021 20:00)    BRIEF HOSPITAL COURSE: 45 y/o F w/ a PMHx of HTN, depression/anxiety, and EtOH abuse (3-5 glasses of wine/day x 3 years) who presented on  s/p witnessed seizure at son's football game. Admitted to [facility] w/ severe EtOH withdrawal/DTs.    Events last 24 hours: No further seizure activity. Remains slightly tremulous. CIWA score is 4.     PAST MEDICAL & SURGICAL HISTORY:  HTN (hypertension)  HTN (hypertension)  Anxiety  H/O  section    Review of Systems:  CONSTITUTIONAL: No fever, chills, or fatigue.  EYES: No eye pain, visual disturbances, or discharge.  ENMT:  No difficulty hearing, tinnitus, or vertigo. No sinus or throat pain.  NECK: No pain or stiffness.  RESPIRATORY: No shortness of breath, cough, or wheezing.  CARDIOVASCULAR: No chest pain, palpitations, dizziness, or leg swelling.  GASTROINTESTINAL: No abdominal or epigastric pain. No nausea, vomiting, diarrhea, or constipation. No hematemesis, melena, or hematochezia.  GENITOURINARY: No dysuria, increased frequency, hematuria, or incontinence.  NEUROLOGICAL: No headaches, memory loss, loss of strength, numbness, or tremors.  SKIN: No itching, burning, rashes, or lesions.  MUSCULOSKELETAL: No joint pain or swelling. No muscle, back, or extremity pain.  PSYCHIATRIC: No depression, anxiety, mood swings, or difficulty sleeping.    Medications:  metoprolol tartrate 12.5 milliGRAM(s) Oral two times a day  diazepam    Tablet 5 milliGRAM(s) Oral every 4 hours  diazepam    Tablet 5 milliGRAM(s) Oral every 4 hours PRN  LORazepam   Injectable 2 milliGRAM(s) IV Push every 15 minutes PRN  sertraline 50 milliGRAM(s) Oral daily  enoxaparin Injectable 40 milliGRAM(s) SubCutaneous daily  pantoprazole  Injectable 40 milliGRAM(s) IV Push daily  folic acid 1 milliGRAM(s) Oral daily  multivitamin 1 Tablet(s) Oral daily  thiamine IVPB 500 milliGRAM(s) IV Intermittent daily  chlorhexidine 2% Cloths 1 Application(s) Topical <User Schedule>    ICU Vital Signs Last 24 Hrs  T(C): 36.6 (2021 00:50), Max: 36.8 (2021 06:00)  T(F): 97.8 (2021 00:50), Max: 98.2 (2021 06:00)  HR: 79 (2021 00:50) (77 - 94)  BP: 122/87 (2021 00:50) (96/76 - 153/89)  BP(mean): 97 (2021 00:50) (80 - 130)  ABP: --  ABP(mean): --  RR: 14 (2021 00:50) (14 - 20)  SpO2: 97% (2021 00:50) (95% - 100%)    I&O's Detail    2021 07:01  -  2021 02:02  --------------------------------------------------------  IN:    Oral Fluid: 600 mL  Total IN: 600 mL    OUT:    Voided (mL): 600 mL  Total OUT: 600 mL    Total NET: 0 mL    LABS:                        10.8   5.57  )-----------( 61       ( 2021 12:47 )             32.1     11-02    134<L>  |  101  |  7   ----------------------------<  94  4.0   |  25  |  0.51    Ca    8.3<L>      2021 12:47  Phos  4.4     11-02  Mg     2.0     11-02    TPro  6.9  /  Alb  3.1<L>  /  TBili  1.0  /  DBili  x   /  AST  124<H>  /  ALT  68<H>  /  AlkPhos  102  11-02    CARDIAC MARKERS ( 2021 12:47 )  x     / x     / 136 U/L / x     / x        CAPILLARY BLOOD GLUCOSE    PT/INR - ( 2021 20:04 )   PT: 12.1 sec;   INR: 1.00 ratio    PTT - ( 2021 20:04 )  PTT:27.3 sec    CULTURES:    Physical Examination:    General: Resting comfortably in bed.    HEENT: Pupils equal, reactive to light. Symmetric. No scleral icterus or injection.    PULM: Clear to auscultation b/l.    NECK: Supple, no lymphadenopathy, trachea midline.    CVS: Regular rate and rhythm, no murmurs appreciated, +s1/s2.    ABD: Soft, nondistended, nontender, normoactive bowel sounds.    EXT: No edema, nontender.    SKIN: Warm and well perfused, no rashes noted.    NEURO: Alert, oriented, interactive, nonfocal.    RADIOLOGY:  < from: CT Head No Cont (21 @ 20:26) >  EXAM:  CT BRAIN                          PROCEDURE DATE:  2021      INTERPRETATION:  Exam Date: 2021 8:26 PM    CT head without IV contrast    CLINICAL INFORMATION:  seizure    TECHNIQUE: Contiguous axial sections were obtained through the head.   Coronal and sagittal reformats were obtained.    COMPARISON: No previous examinations are available for review.    FINDINGS:    There is no evidence of intraparenchymal or extraaxial hemorrhage.   There is no CT evidence of large vessel acute infarct. No mass effect is found in the brain.  No evidence of midline shift or herniation pattern.    The ventricles, sulci and basal cisterns appear unremarkable.    Visualized paranasal sinuses are clear.    IMPRESSION:    No acute intracranial findings.    --- End of Report ---    SONI HILL MD; Attending Radiologist  This document has been electronically signed. 2021  8:27PM    < end of copied text >

## 2021-11-02 NOTE — PROGRESS NOTE ADULT - PROBLEM SELECTOR PLAN 7
was started on LMWH 40 mg sub Q daily for DVT prophylaxis, also Pantoprazole 40 mg IVP daily for stress ulcer prophylaxis. Lovenox 40mg qd Abd us with hepatic steatosis Continue vitamin supplementation  Trend cbc

## 2021-11-02 NOTE — PROGRESS NOTE ADULT - SUBJECTIVE AND OBJECTIVE BOX
Patient is a 46y old  Female who presents with a chief complaint of Seizure. (02 Nov 2021 11:52)      INTERVAL HPI/OVERNIGHT EVENTS: Patient seen and examined at bedside. No overnight events.       MEDICATIONS  (STANDING):  chlorhexidine 2% Cloths 1 Application(s) Topical <User Schedule>  diazepam    Tablet 5 milliGRAM(s) Oral every 4 hours  enoxaparin Injectable 40 milliGRAM(s) SubCutaneous daily  folic acid 1 milliGRAM(s) Oral daily  metoprolol tartrate 12.5 milliGRAM(s) Oral two times a day  multivitamin 1 Tablet(s) Oral daily  pantoprazole  Injectable 40 milliGRAM(s) IV Push daily  thiamine IVPB 500 milliGRAM(s) IV Intermittent daily    MEDICATIONS  (PRN):  diazepam    Tablet 5 milliGRAM(s) Oral every 4 hours PRN for ciwa > 5  LORazepam   Injectable 2 milliGRAM(s) IV Push every 15 minutes PRN for ciwa > 10 or Seizure activity      Allergies    No Known Allergies    Intolerances        REVIEW OF SYSTEMS:  CONSTITUTIONAL: No fever or chills  HEENT:  No headache, no sore throat  RESPIRATORY: No cough, wheezing, or shortness of breath  CARDIOVASCULAR: No chest pain, palpitations, or leg swelling  GASTROINTESTINAL: No abd pain, nausea, vomiting, or diarrhea  GENITOURINARY: No dysuria, frequency, or hematuria  NEUROLOGICAL: no focal weakness or dizziness  MUSCULOSKELETAL: no myalgias     Vital Signs Last 24 Hrs  T(C): 36.2 (02 Nov 2021 08:00), Max: 36.8 (02 Nov 2021 06:00)  T(F): 97.2 (02 Nov 2021 08:00), Max: 98.2 (02 Nov 2021 06:00)  HR: 77 (02 Nov 2021 08:00) (77 - 116)  BP: 153/89 (02 Nov 2021 08:00) (105/83 - 153/89)  BP(mean): 110 (02 Nov 2021 08:00) (89 - 110)  RR: 15 (02 Nov 2021 08:00) (11 - 22)  SpO2: 96% (02 Nov 2021 08:00) (96% - 100%)    PHYSICAL EXAM:  GENERAL: NAD  HEENT:  EOMI, moist mucous membranes  CHEST/LUNG:  CTA b/l, no rales, wheezes, or rhonchi  HEART:  RRR, S1, S2  ABDOMEN:  BS+, soft, nontender, nondistended  EXTREMITIES: (+) hand tremors. No edema, cyanosis, or calf tenderness  NERVOUS SYSTEM: AA&Ox3    LABS:                        10.8   5.57  )-----------( 61       ( 02 Nov 2021 12:47 )             32.1     CBC Full  -  ( 02 Nov 2021 12:47 )  WBC Count : 5.57 K/uL  Hemoglobin : 10.8 g/dL  Hematocrit : 32.1 %  Platelet Count - Automated : 61 K/uL  Mean Cell Volume : 110.7 fl  Mean Cell Hemoglobin : 37.2 pg  Mean Cell Hemoglobin Concentration : 33.6 gm/dL  Auto Neutrophil # : 3.97 K/uL  Auto Lymphocyte # : 0.81 K/uL  Auto Monocyte # : 0.66 K/uL  Auto Eosinophil # : 0.08 K/uL  Auto Basophil # : 0.03 K/uL  Auto Neutrophil % : 71.4 %  Auto Lymphocyte % : 14.5 %  Auto Monocyte % : 11.8 %  Auto Eosinophil % : 1.4 %  Auto Basophil % : 0.5 %    02 Nov 2021 12:47    134    |  101    |  7      ----------------------------<  94     4.0     |  25     |  0.51     Ca    8.3        02 Nov 2021 12:47  Phos  4.4       02 Nov 2021 12:47  Mg     2.0       02 Nov 2021 12:47    TPro  6.9    /  Alb  3.1    /  TBili  1.0    /  DBili  x      /  AST  124    /  ALT  68     /  AlkPhos  102    02 Nov 2021 12:47    PT/INR - ( 01 Nov 2021 20:04 )   PT: 12.1 sec;   INR: 1.00 ratio         PTT - ( 01 Nov 2021 20:04 )  PTT:27.3 sec    CAPILLARY BLOOD GLUCOSE              RADIOLOGY & ADDITIONAL TESTS: < from: US Abdomen Complete (US Abdomen Complete .) (11.02.21 @ 09:10) >  EXAM:  US ABDOMEN COMPLETE                                  PROCEDURE DATE:  11/02/2021          INTERPRETATION:  CLINICAL INFORMATION:    COMPARISON: None available.    TECHNIQUE: Sonography of the abdomen.    FINDINGS:    Liver: Steatosis  Bile ducts: Normal caliber. Common bile duct measures 0.5 cm.  Gallbladder: Within normal limits.  Pancreas: Visualized portions are within normal limits.  Spleen: 11.2 cm. Within normal limits.  Right kidney: 11.6 cm. No hydronephrosis.  Left kidney: 11.9 cm.  No hydronephrosis.  Ascites: None.  Aorta and IVC: Visualized portions are within normal limits.    IMPRESSION:  Fatty liver. Otherwise unremarkable study.        --- End of Report ---              CALDERON LIMON MD; Attending Radiologist  This document has been electronically signed. Nov 2 2021  9:48AM    < end of copied text >    < from: CT Head No Cont (11.01.21 @ 20:26) >    EXAM:  CT BRAIN                                  PROCEDURE DATE:  11/01/2021          INTERPRETATION:  Exam Date: 11/1/2021 8:26 PM    CT head without IV contrast    CLINICAL INFORMATION:  seizure    TECHNIQUE: Contiguous axial sections were obtained through the head.   Coronal and sagittal reformats were obtained.    COMPARISON: No previous examinations are available for review.    FINDINGS:    There is no evidence of intraparenchymal or extraaxial hemorrhage.   There is no CT evidence of large vessel acute infarct. No mass effect is found in the brain.  No evidence of midline shift or herniation pattern.    The ventricles, sulci and basal cisterns appear unremarkable.    Visualized paranasal sinuses are clear.    IMPRESSION:    No acute intracranial findings.    --- End of Report ---              SONI HILL MD; Attending Radiologist  This document has been electronically signed. Nov 1 2021  8:27PM    < end of copied text >        Consultant(s) Notes Reviewed:  [x] YES  [ ] NO    Care Discussed with [x] Consultants  [x] Patient  [ ] Family  [ ]      [ x] Other; RN  DVT ppx   Patient is a 46y old  Female who presents with a chief complaint of Seizure. (02 Nov 2021 11:52)      INTERVAL HPI/OVERNIGHT EVENTS: Patient seen and examined at bedside. No overnight events.       MEDICATIONS  (STANDING):  chlorhexidine 2% Cloths 1 Application(s) Topical <User Schedule>  diazepam    Tablet 5 milliGRAM(s) Oral every 4 hours  enoxaparin Injectable 40 milliGRAM(s) SubCutaneous daily  folic acid 1 milliGRAM(s) Oral daily  metoprolol tartrate 12.5 milliGRAM(s) Oral two times a day  multivitamin 1 Tablet(s) Oral daily  pantoprazole  Injectable 40 milliGRAM(s) IV Push daily  thiamine IVPB 500 milliGRAM(s) IV Intermittent daily    MEDICATIONS  (PRN):  diazepam    Tablet 5 milliGRAM(s) Oral every 4 hours PRN for ciwa > 5  LORazepam   Injectable 2 milliGRAM(s) IV Push every 15 minutes PRN for ciwa > 10 or Seizure activity      Allergies    No Known Allergies    Intolerances        REVIEW OF SYSTEMS:  CONSTITUTIONAL: No fever or chills  HEENT:  No headache, no sore throat  RESPIRATORY: No cough, wheezing, or shortness of breath  CARDIOVASCULAR: No chest pain, palpitations, or leg swelling  GASTROINTESTINAL: No abd pain, nausea, vomiting, or diarrhea  GENITOURINARY: No dysuria, frequency, or hematuria  NEUROLOGICAL: no focal weakness or dizziness  MUSCULOSKELETAL: no myalgias     Vital Signs Last 24 Hrs  T(C): 36.2 (02 Nov 2021 08:00), Max: 36.8 (02 Nov 2021 06:00)  T(F): 97.2 (02 Nov 2021 08:00), Max: 98.2 (02 Nov 2021 06:00)  HR: 77 (02 Nov 2021 08:00) (77 - 116)  BP: 153/89 (02 Nov 2021 08:00) (105/83 - 153/89)  BP(mean): 110 (02 Nov 2021 08:00) (89 - 110)  RR: 15 (02 Nov 2021 08:00) (11 - 22)  SpO2: 96% (02 Nov 2021 08:00) (96% - 100%)    PHYSICAL EXAM:  GENERAL: NAD, unkempt  HEENT:  EOMI, moist mucous membranes  CHEST/LUNG:  CTA b/l, no rales, wheezes, or rhonchi  HEART:  RRR, S1, S2  ABDOMEN:  BS+, soft, nontender, nondistended  EXTREMITIES: (+) hand tremors. No edema, cyanosis, or calf tenderness  NERVOUS SYSTEM: AA&Ox3    LABS:                        10.8   5.57  )-----------( 61       ( 02 Nov 2021 12:47 )             32.1     CBC Full  -  ( 02 Nov 2021 12:47 )  WBC Count : 5.57 K/uL  Hemoglobin : 10.8 g/dL  Hematocrit : 32.1 %  Platelet Count - Automated : 61 K/uL  Mean Cell Volume : 110.7 fl  Mean Cell Hemoglobin : 37.2 pg  Mean Cell Hemoglobin Concentration : 33.6 gm/dL  Auto Neutrophil # : 3.97 K/uL  Auto Lymphocyte # : 0.81 K/uL  Auto Monocyte # : 0.66 K/uL  Auto Eosinophil # : 0.08 K/uL  Auto Basophil # : 0.03 K/uL  Auto Neutrophil % : 71.4 %  Auto Lymphocyte % : 14.5 %  Auto Monocyte % : 11.8 %  Auto Eosinophil % : 1.4 %  Auto Basophil % : 0.5 %    02 Nov 2021 12:47    134    |  101    |  7      ----------------------------<  94     4.0     |  25     |  0.51     Ca    8.3        02 Nov 2021 12:47  Phos  4.4       02 Nov 2021 12:47  Mg     2.0       02 Nov 2021 12:47    TPro  6.9    /  Alb  3.1    /  TBili  1.0    /  DBili  x      /  AST  124    /  ALT  68     /  AlkPhos  102    02 Nov 2021 12:47    PT/INR - ( 01 Nov 2021 20:04 )   PT: 12.1 sec;   INR: 1.00 ratio         PTT - ( 01 Nov 2021 20:04 )  PTT:27.3 sec    CAPILLARY BLOOD GLUCOSE              RADIOLOGY & ADDITIONAL TESTS: < from: US Abdomen Complete (US Abdomen Complete .) (11.02.21 @ 09:10) >  EXAM:  US ABDOMEN COMPLETE                                  PROCEDURE DATE:  11/02/2021          INTERPRETATION:  CLINICAL INFORMATION:    COMPARISON: None available.    TECHNIQUE: Sonography of the abdomen.    FINDINGS:    Liver: Steatosis  Bile ducts: Normal caliber. Common bile duct measures 0.5 cm.  Gallbladder: Within normal limits.  Pancreas: Visualized portions are within normal limits.  Spleen: 11.2 cm. Within normal limits.  Right kidney: 11.6 cm. No hydronephrosis.  Left kidney: 11.9 cm.  No hydronephrosis.  Ascites: None.  Aorta and IVC: Visualized portions are within normal limits.    IMPRESSION:  Fatty liver. Otherwise unremarkable study.        --- End of Report ---              CALDERON LIMON MD; Attending Radiologist  This document has been electronically signed. Nov 2 2021  9:48AM    < end of copied text >    < from: CT Head No Cont (11.01.21 @ 20:26) >    EXAM:  CT BRAIN                                  PROCEDURE DATE:  11/01/2021          INTERPRETATION:  Exam Date: 11/1/2021 8:26 PM    CT head without IV contrast    CLINICAL INFORMATION:  seizure    TECHNIQUE: Contiguous axial sections were obtained through the head.   Coronal and sagittal reformats were obtained.    COMPARISON: No previous examinations are available for review.    FINDINGS:    There is no evidence of intraparenchymal or extraaxial hemorrhage.   There is no CT evidence of large vessel acute infarct. No mass effect is found in the brain.  No evidence of midline shift or herniation pattern.    The ventricles, sulci and basal cisterns appear unremarkable.    Visualized paranasal sinuses are clear.    IMPRESSION:    No acute intracranial findings.    --- End of Report ---              SONI HILL MD; Attending Radiologist  This document has been electronically signed. Nov 1 2021  8:27PM    < end of copied text >        Consultant(s) Notes Reviewed:  [x] YES  [ ] NO    Care Discussed with [x] Consultants  [x] Patient  [ ] Family  [ ]      [ x] Other; RN  DVT ppx   Patient is a 46y old  Female who presents with a chief complaint of Seizure. (02 Nov 2021 11:52)      INTERVAL HPI/OVERNIGHT EVENTS: Patient seen and examined at bedside. No overnight events.       MEDICATIONS  (STANDING):  chlorhexidine 2% Cloths 1 Application(s) Topical <User Schedule>  diazepam    Tablet 5 milliGRAM(s) Oral every 4 hours  enoxaparin Injectable 40 milliGRAM(s) SubCutaneous daily  folic acid 1 milliGRAM(s) Oral daily  metoprolol tartrate 12.5 milliGRAM(s) Oral two times a day  multivitamin 1 Tablet(s) Oral daily  pantoprazole  Injectable 40 milliGRAM(s) IV Push daily  thiamine IVPB 500 milliGRAM(s) IV Intermittent daily    MEDICATIONS  (PRN):  diazepam    Tablet 5 milliGRAM(s) Oral every 4 hours PRN for ciwa > 5  LORazepam   Injectable 2 milliGRAM(s) IV Push every 15 minutes PRN for ciwa > 10 or Seizure activity      Allergies    No Known Allergies    Intolerances        REVIEW OF SYSTEMS:  CONSTITUTIONAL: No fever or chills  HEENT:  No headache, no sore throat  RESPIRATORY: No cough, wheezing, or shortness of breath  CARDIOVASCULAR: No chest pain, palpitations, or leg swelling  GASTROINTESTINAL: Admits diarrhea No abd pain, nausea, vomiting  GENITOURINARY: No dysuria, frequency, or hematuria  NEUROLOGICAL: no focal weakness or dizziness  MUSCULOSKELETAL: no myalgias     Vital Signs Last 24 Hrs  T(C): 36.2 (02 Nov 2021 08:00), Max: 36.8 (02 Nov 2021 06:00)  T(F): 97.2 (02 Nov 2021 08:00), Max: 98.2 (02 Nov 2021 06:00)  HR: 77 (02 Nov 2021 08:00) (77 - 116)  BP: 153/89 (02 Nov 2021 08:00) (105/83 - 153/89)  BP(mean): 110 (02 Nov 2021 08:00) (89 - 110)  RR: 15 (02 Nov 2021 08:00) (11 - 22)  SpO2: 96% (02 Nov 2021 08:00) (96% - 100%)    PHYSICAL EXAM:  GENERAL: NAD, unkempt  HEENT:  EOMI, moist mucous membranes  CHEST/LUNG:  CTA b/l, no rales, wheezes, or rhonchi  HEART:  RRR, S1, S2  ABDOMEN:  BS+, soft, nontender, nondistended  EXTREMITIES: (+) hand tremors. No edema, cyanosis, or calf tenderness  NERVOUS SYSTEM: AA&Ox3    LABS:                        10.8   5.57  )-----------( 61       ( 02 Nov 2021 12:47 )             32.1     CBC Full  -  ( 02 Nov 2021 12:47 )  WBC Count : 5.57 K/uL  Hemoglobin : 10.8 g/dL  Hematocrit : 32.1 %  Platelet Count - Automated : 61 K/uL  Mean Cell Volume : 110.7 fl  Mean Cell Hemoglobin : 37.2 pg  Mean Cell Hemoglobin Concentration : 33.6 gm/dL  Auto Neutrophil # : 3.97 K/uL  Auto Lymphocyte # : 0.81 K/uL  Auto Monocyte # : 0.66 K/uL  Auto Eosinophil # : 0.08 K/uL  Auto Basophil # : 0.03 K/uL  Auto Neutrophil % : 71.4 %  Auto Lymphocyte % : 14.5 %  Auto Monocyte % : 11.8 %  Auto Eosinophil % : 1.4 %  Auto Basophil % : 0.5 %    02 Nov 2021 12:47    134    |  101    |  7      ----------------------------<  94     4.0     |  25     |  0.51     Ca    8.3        02 Nov 2021 12:47  Phos  4.4       02 Nov 2021 12:47  Mg     2.0       02 Nov 2021 12:47    TPro  6.9    /  Alb  3.1    /  TBili  1.0    /  DBili  x      /  AST  124    /  ALT  68     /  AlkPhos  102    02 Nov 2021 12:47    PT/INR - ( 01 Nov 2021 20:04 )   PT: 12.1 sec;   INR: 1.00 ratio         PTT - ( 01 Nov 2021 20:04 )  PTT:27.3 sec    CAPILLARY BLOOD GLUCOSE              RADIOLOGY & ADDITIONAL TESTS: < from: US Abdomen Complete (US Abdomen Complete .) (11.02.21 @ 09:10) >  EXAM:  US ABDOMEN COMPLETE                                  PROCEDURE DATE:  11/02/2021          INTERPRETATION:  CLINICAL INFORMATION:    COMPARISON: None available.    TECHNIQUE: Sonography of the abdomen.    FINDINGS:    Liver: Steatosis  Bile ducts: Normal caliber. Common bile duct measures 0.5 cm.  Gallbladder: Within normal limits.  Pancreas: Visualized portions are within normal limits.  Spleen: 11.2 cm. Within normal limits.  Right kidney: 11.6 cm. No hydronephrosis.  Left kidney: 11.9 cm.  No hydronephrosis.  Ascites: None.  Aorta and IVC: Visualized portions are within normal limits.    IMPRESSION:  Fatty liver. Otherwise unremarkable study.        --- End of Report ---              CALDERON LIMON MD; Attending Radiologist  This document has been electronically signed. Nov 2 2021  9:48AM    < end of copied text >    < from: CT Head No Cont (11.01.21 @ 20:26) >    EXAM:  CT BRAIN                                  PROCEDURE DATE:  11/01/2021          INTERPRETATION:  Exam Date: 11/1/2021 8:26 PM    CT head without IV contrast    CLINICAL INFORMATION:  seizure    TECHNIQUE: Contiguous axial sections were obtained through the head.   Coronal and sagittal reformats were obtained.    COMPARISON: No previous examinations are available for review.    FINDINGS:    There is no evidence of intraparenchymal or extraaxial hemorrhage.   There is no CT evidence of large vessel acute infarct. No mass effect is found in the brain.  No evidence of midline shift or herniation pattern.    The ventricles, sulci and basal cisterns appear unremarkable.    Visualized paranasal sinuses are clear.    IMPRESSION:    No acute intracranial findings.    --- End of Report ---              SONI HILL MD; Attending Radiologist  This document has been electronically signed. Nov 1 2021  8:27PM    < end of copied text >        Consultant(s) Notes Reviewed:  [x] YES  [ ] NO    Care Discussed with [x] Consultants  [x] Patient  [ ] Family  [ ]      [ x] Other; RN  DVT ppx   Patient is a 46y old  Female who presents with a chief complaint of Seizure. (02 Nov 2021 11:52)      INTERVAL HPI/OVERNIGHT EVENTS: Patient seen and examined at bedside. No overnight events.       MEDICATIONS  (STANDING):  chlorhexidine 2% Cloths 1 Application(s) Topical <User Schedule>  diazepam    Tablet 5 milliGRAM(s) Oral every 4 hours  enoxaparin Injectable 40 milliGRAM(s) SubCutaneous daily  folic acid 1 milliGRAM(s) Oral daily  metoprolol tartrate 12.5 milliGRAM(s) Oral two times a day  multivitamin 1 Tablet(s) Oral daily  pantoprazole  Injectable 40 milliGRAM(s) IV Push daily  thiamine IVPB 500 milliGRAM(s) IV Intermittent daily    MEDICATIONS  (PRN):  diazepam    Tablet 5 milliGRAM(s) Oral every 4 hours PRN for ciwa > 5  LORazepam   Injectable 2 milliGRAM(s) IV Push every 15 minutes PRN for ciwa > 10 or Seizure activity      Allergies    No Known Allergies    Intolerances        REVIEW OF SYSTEMS:  CONSTITUTIONAL: No fever or chills  HEENT:  No headache, no sore throat  RESPIRATORY: No cough, wheezing, or shortness of breath  CARDIOVASCULAR: No chest pain, palpitations, or leg swelling  GASTROINTESTINAL: Admits diarrhea No abd pain, nausea, vomiting  GENITOURINARY: No dysuria, frequency, or hematuria  NEUROLOGICAL: Admits tremors. No focal weakness or dizziness  MUSCULOSKELETAL: no myalgias     Vital Signs Last 24 Hrs  T(C): 36.2 (02 Nov 2021 08:00), Max: 36.8 (02 Nov 2021 06:00)  T(F): 97.2 (02 Nov 2021 08:00), Max: 98.2 (02 Nov 2021 06:00)  HR: 77 (02 Nov 2021 08:00) (77 - 116)  BP: 153/89 (02 Nov 2021 08:00) (105/83 - 153/89)  BP(mean): 110 (02 Nov 2021 08:00) (89 - 110)  RR: 15 (02 Nov 2021 08:00) (11 - 22)  SpO2: 96% (02 Nov 2021 08:00) (96% - 100%)    PHYSICAL EXAM:  GENERAL: NAD  HEENT:  EOMI, moist mucous membranes  CHEST/LUNG:  CTA b/l, no rales, wheezes, or rhonchi  HEART:  RRR, S1, S2  ABDOMEN:  BS+, soft, nontender, nondistended  EXTREMITIES: (+) hand tremors. No edema, cyanosis, or calf tenderness  NERVOUS SYSTEM: AA&Ox3    LABS:                        10.8   5.57  )-----------( 61       ( 02 Nov 2021 12:47 )             32.1     CBC Full  -  ( 02 Nov 2021 12:47 )  WBC Count : 5.57 K/uL  Hemoglobin : 10.8 g/dL  Hematocrit : 32.1 %  Platelet Count - Automated : 61 K/uL  Mean Cell Volume : 110.7 fl  Mean Cell Hemoglobin : 37.2 pg  Mean Cell Hemoglobin Concentration : 33.6 gm/dL  Auto Neutrophil # : 3.97 K/uL  Auto Lymphocyte # : 0.81 K/uL  Auto Monocyte # : 0.66 K/uL  Auto Eosinophil # : 0.08 K/uL  Auto Basophil # : 0.03 K/uL  Auto Neutrophil % : 71.4 %  Auto Lymphocyte % : 14.5 %  Auto Monocyte % : 11.8 %  Auto Eosinophil % : 1.4 %  Auto Basophil % : 0.5 %    02 Nov 2021 12:47    134    |  101    |  7      ----------------------------<  94     4.0     |  25     |  0.51     Ca    8.3        02 Nov 2021 12:47  Phos  4.4       02 Nov 2021 12:47  Mg     2.0       02 Nov 2021 12:47    TPro  6.9    /  Alb  3.1    /  TBili  1.0    /  DBili  x      /  AST  124    /  ALT  68     /  AlkPhos  102    02 Nov 2021 12:47    PT/INR - ( 01 Nov 2021 20:04 )   PT: 12.1 sec;   INR: 1.00 ratio         PTT - ( 01 Nov 2021 20:04 )  PTT:27.3 sec    CAPILLARY BLOOD GLUCOSE              RADIOLOGY & ADDITIONAL TESTS: < from: US Abdomen Complete (US Abdomen Complete .) (11.02.21 @ 09:10) >  EXAM:  US ABDOMEN COMPLETE                                  PROCEDURE DATE:  11/02/2021          INTERPRETATION:  CLINICAL INFORMATION:    COMPARISON: None available.    TECHNIQUE: Sonography of the abdomen.    FINDINGS:    Liver: Steatosis  Bile ducts: Normal caliber. Common bile duct measures 0.5 cm.  Gallbladder: Within normal limits.  Pancreas: Visualized portions are within normal limits.  Spleen: 11.2 cm. Within normal limits.  Right kidney: 11.6 cm. No hydronephrosis.  Left kidney: 11.9 cm.  No hydronephrosis.  Ascites: None.  Aorta and IVC: Visualized portions are within normal limits.    IMPRESSION:  Fatty liver. Otherwise unremarkable study.        --- End of Report ---              CALDERON LIMON MD; Attending Radiologist  This document has been electronically signed. Nov 2 2021  9:48AM    < end of copied text >    < from: CT Head No Cont (11.01.21 @ 20:26) >    EXAM:  CT BRAIN                                  PROCEDURE DATE:  11/01/2021          INTERPRETATION:  Exam Date: 11/1/2021 8:26 PM    CT head without IV contrast    CLINICAL INFORMATION:  seizure    TECHNIQUE: Contiguous axial sections were obtained through the head.   Coronal and sagittal reformats were obtained.    COMPARISON: No previous examinations are available for review.    FINDINGS:    There is no evidence of intraparenchymal or extraaxial hemorrhage.   There is no CT evidence of large vessel acute infarct. No mass effect is found in the brain.  No evidence of midline shift or herniation pattern.    The ventricles, sulci and basal cisterns appear unremarkable.    Visualized paranasal sinuses are clear.    IMPRESSION:    No acute intracranial findings.    --- End of Report ---              SONI HILL MD; Attending Radiologist  This document has been electronically signed. Nov 1 2021  8:27PM    < end of copied text >        Consultant(s) Notes Reviewed:  [x] YES  [ ] NO    Care Discussed with [x] Consultants  [x] Patient  [ ] Family  [ ]      [ x] Other; RN  DVT ppx

## 2021-11-02 NOTE — PROGRESS NOTE ADULT - PROBLEM SELECTOR PLAN 5
Ml related to ETOH abuse, patient is willing to quit and requested detox, was started on Folic acid supplementation, monitor. Continue vitamin supplementation  Trend cbc Improved, likely 2/2 Escitalopram   Trend bmp Likely viral, improved  Check stool for C diff and GI PCR

## 2021-11-02 NOTE — CONSULT NOTE ADULT - SUBJECTIVE AND OBJECTIVE BOX
Patient is a 46y old  Female who presents with a chief complaint of Seizures. (2021 06:57)    HPI: This is a 47 y/o F with PMH of HTN, ETOH abuse, and Anxiety who presented with a seizure episode witnessed by her  who reported seeing her falling to the ground with her jaw clenched, and hands shacking, then she recovered spontaneously after 5 min, no vomiting, or incontinence during the episode. Patient stated that she was "looking at the arnaud" and fell down backwards hitting her head. No history of seizures in the past. Patient with history of ETOH abuse, drinking every night, with her last drink was last night. On arrival to ED she was AAO X3, but with marked tremors, received Diazepam 10 mg IVP X1 dose, Ativan 1 mg IVP X2 doses, and Chlordiazepoxide 50 mg PO X1 dose, and was admitted to [facility] for ETOH detox.      (2021 23:49)  Currently is being monitored in [facility].  AAOX3, Shaky/Tremulous.  No further seizure reported    PAST MEDICAL & SURGICAL HISTORY:    HTN (hypertension)    HTN (hypertension)    Anxiety    H/O  section    MEDICATIONS  (STANDING):  chlorhexidine 2% Cloths 1 Application(s) Topical <User Schedule>  diazepam    Tablet 5 milliGRAM(s) Oral every 4 hours  enoxaparin Injectable 40 milliGRAM(s) SubCutaneous daily  folic acid 1 milliGRAM(s) Oral daily  metoprolol tartrate 12.5 milliGRAM(s) Oral two times a day  multivitamin 1 Tablet(s) Oral daily  pantoprazole  Injectable 40 milliGRAM(s) IV Push daily  thiamine IVPB 500 milliGRAM(s) IV Intermittent daily    MEDICATIONS  (PRN):    diazepam    Tablet 5 milliGRAM(s) Oral every 4 hours PRN for ciwa > 5  LORazepam   Injectable 2 milliGRAM(s) IV Push every 15 minutes PRN for ciwa > 10 or Seizure activity    Allergies    No Known Allergies    SOCIAL HISTORY:    No h/o Smoking.   Pt drinks alcohol daily.    FAMILY HISTORY:    No pertinent family history in first degree relatives    REVIEW OF SYSTEMS:    CONSTITUTIONAL: No fever  EYES: No eye pain,   ENMT:  No sinus or throat pain  NECK: No pain or stiffness  RESPIRATORY: No cough, No hemoptysis; No shortness of breath  CARDIOVASCULAR: No acute chest pain, palpitations,  or leg swelling  GASTROINTESTINAL: No abdominal pain. No nausea, vomiting, or hematemesis;  No melena or hematochezia.  GENITOURINARY: No  hematuria, or incontinence  MUSCULOSKELETAL: No joint swelling; No extremity pain  SKIN: No itching, rashes, or lesions   LYMPH NODES: No enlarged glands  NEUROLOGICAL: No headaches, memory loss, Currently shaky/Tremulous. No h/o prior seizures  PSYCHIATRIC: No depression, anxiety, mood swings, or difficulty sleeping  ENDOCRINE: No heat or cold intolerance;   HEME/LYMPH: No easy bruising, or bleeding gums  Allergy/Immunology. No medication allergy. No seasonal allergies.    PHYSICAL EXAM:  Vital Signs Last 24 Hrs  T(F): 97.2 (21 @ 08:00)  HR: 77 (21 @ 08:00)  BP: 153/89 (21 @ 08:00)  RR: 15 (21 @ 08:00)    GENERAL: NAD, well-groomed, well-developed  HEAD:  Atraumatic, Normocephalic  EYES: EOMI, PERRLA, conjunctiva and sclera clear  NECK: Supple, No JVD, thyroid non-palpable    On Neurological Examination:    Mental Status - Pt is alert, awake, oriented X3. Higher functions are intact. Follows commands well and able to answer questions appropriately.    Speech -  Normal. Pt has no aphasia.    Cranial Nerves - Pupils 3 mm equal and reactive to light, extraocular eye movements intact. Pt has no visual field deficit.  Pt has no facial asymmetry. Tongue - is in midline.    Motor Exam - 4 plus/5 all over, No drift. Shaky/tremulous      Sensory Exam - Pt withdraws all extremities equally on stimulation. No asymmetry seen. No complaints of tingling, numbness.    Gait - Pt is able to stand up with holding my hands.     Deep tendon Reflexes - 2 plus all over.    Coordination - Fine finger movements and  Finger to nose normal except that pt is shaky.       Romberg - Negative.    Neck Supple -  Yes.    LABS:                        11.6   6.89  )-----------( 63       ( 2021 20:04 )             34.2         132<L>  |  93<L>  |  11  ----------------------------<  125<H>  2.9<LL>   |  24  |  0.62    Ca    8.8      2021 20:00    TPro  8.3  /  Alb  4.0  /  TBili  1.2  /  DBili  x   /  AST  184<H>  /  ALT  92<H>  /  AlkPhos  130<H>  11    PT/INR - ( 2021 20:04 )   PT: 12.1 sec;   INR: 1.00 ratio       PTT - ( 2021 20:04 )  PTT:27.3 sec    RADIOLOGY & ADDITIONAL STUDIES:    < from: CT Head No Cont (21 @ 20:26) >    No acute intracranial findings.    < end of copied text >    < from: US Abdomen Complete (US Abdomen Complete .) (21 @ 09:10) >    Fatty liver. Otherwise unremarkable study.    < end of copied text >

## 2021-11-02 NOTE — CONSULT NOTE ADULT - ASSESSMENT
47 y/o F with PMH of HTN, ETOH abuse, and Anxiety presented with a seizure episode. 47 y/o F with PMH of HTN, ETOH abuse, and Anxiety presented with a seizure episode.    drinker - non smoker - anxious - depressed - grieving (lost her father)    valium ATC with hold parameters  valium PRN as per CIWA  ativan PRN as per CIWA and for Seizure activity  education - counseling - emotional support  may need Psych eval on this admission  planned for US abd - LFTs noted - surveillance  [facility] monitoring in progress  hydration  AA referral

## 2021-11-02 NOTE — CONSULT NOTE ADULT - SUBJECTIVE AND OBJECTIVE BOX
Date/Time Patient Seen:  		  Referring MD:   Data Reviewed	       Patient is a 46y old  Female who presents with a chief complaint of Seizures. (2021 23:49)      Subjective/HPI   45 y/o F with PMH of HTN, ETOH abuse, and Anxiety who presented with a seizure episode witnessed by her  who reported seeing her falling to the ground with her jaw clenched, and hands shacking, then she recovered spontaneously after 5 min, no vomiting, or incontinence during the episode. Patient stated that she was "looking at the arnaud" and fell down backwards hitting her head. No history of seizures in the past. Patient with history of ETOH abuse, drinking every night, with her last drink was last night. On arrival to ED she was AAO X3, but with marked tremors, received Diazepam 10 mg IVP X1 dose, Ativan 1 mg IVP X2 doses, and Chlordiazepoxide 50 mg PO X1 dose, and was admitted to [facility] for ETOH detox.     PAST MEDICAL & SURGICAL HISTORY:  HTN (hypertension)    HTN (hypertension)    Anxiety    H/O  section    PAST SURGICAL HISTORY:  H/O  section.     FAMILY HISTORY:  No pertinent family history in first degree relatives. No pertinent family history of: alcoholism.     Social History:  Social History (marital status, living situation, occupation, tobacco use, alcohol and drug use, and sexual history): -    , lives with family, working in spa, non smoker, drinks about 4 wine drinks every night, no drug abuse.     Tobacco Screening:  · Core Measure Site	No  · Has the patient used tobacco in the past 30 days?	No        Medication list         MEDICATIONS  (STANDING):  chlorhexidine 2% Cloths 1 Application(s) Topical <User Schedule>  diazepam  Injectable 10 milliGRAM(s) IV Push every 6 hours  enoxaparin Injectable 40 milliGRAM(s) SubCutaneous daily  folic acid 1 milliGRAM(s) Oral daily  metoprolol tartrate 12.5 milliGRAM(s) Oral two times a day  multivitamin 1 Tablet(s) Oral daily  pantoprazole  Injectable 40 milliGRAM(s) IV Push daily  thiamine IVPB 500 milliGRAM(s) IV Intermittent daily    MEDICATIONS  (PRN):  diazepam  Injectable 10 milliGRAM(s) IV Push every 1 hour PRN ciwa >8         Vitals log        ICU Vital Signs Last 24 Hrs  T(C): 36.7 (2021 00:00), Max: 36.7 (2021 17:30)  T(F): 98 (2021 00:00), Max: 98 (2021 17:30)  HR: 82 (2021 04:00) (82 - 116)  BP: 123/96 (2021 04:00) (105/83 - 147/87)  BP(mean): 106 (2021 04:00) (89 - 106)  ABP: --  ABP(mean): --  RR: 14 (2021 04:00) (11 - 22)  SpO2: 100% (2021 04:00) (97% - 100%)           Input and Output:  I&O's Detail      Lab Data                        11.6   6.89  )-----------( 63       ( 2021 20:04 )             34.2         132<L>  |  93<L>  |  11  ----------------------------<  125<H>  2.9<LL>   |  24  |  0.62    Ca    8.8      2021 20:00    TPro  8.3  /  Alb  4.0  /  TBili  1.2  /  DBili  x   /  AST  184<H>  /  ALT  92<H>  /  AlkPhos  130<H>              Review of Systems	      Objective     Physical Examination        Pertinent Lab findings & Imaging      Yessenia:  NO   Adequate UO     I&O's Detail           Discussed with:     Cultures:	        Radiology                             Date/Time Patient Seen:  		  Referring MD:   Data Reviewed	       Patient is a 46y old  Female who presents with a chief complaint of Seizures. (2021 23:49)      Subjective/HPI  in bed  seen and examined  vs noted  labs reviewed  H and P reviewed  ER provider note reviewed  drinker  non smoker  lives at home    depressed - anxious  drinks WINE - daily -      45 y/o F with PMH of HTN, ETOH abuse, and Anxiety who presented with a seizure episode witnessed by her  who reported seeing her falling to the ground with her jaw clenched, and hands shacking, then she recovered spontaneously after 5 min, no vomiting, or incontinence during the episode. Patient stated that she was "looking at the arnaud" and fell down backwards hitting her head. No history of seizures in the past. Patient with history of ETOH abuse, drinking every night, with her last drink was last night. On arrival to ED she was AAO X3, but with marked tremors, received Diazepam 10 mg IVP X1 dose, Ativan 1 mg IVP X2 doses, and Chlordiazepoxide 50 mg PO X1 dose, and was admitted to [facility] for ETOH detox.     PAST MEDICAL & SURGICAL HISTORY:  HTN (hypertension)    HTN (hypertension)    Anxiety    H/O  section    PAST SURGICAL HISTORY:  H/O  section.     FAMILY HISTORY:  No pertinent family history in first degree relatives. No pertinent family history of: alcoholism.     Social History:  Social History (marital status, living situation, occupation, tobacco use, alcohol and drug use, and sexual history): -    , lives with family, working in spa, non smoker, drinks about 4 wine drinks every night, no drug abuse.     Tobacco Screening:  · Core Measure Site	No  · Has the patient used tobacco in the past 30 days?	No        Medication list         MEDICATIONS  (STANDING):  chlorhexidine 2% Cloths 1 Application(s) Topical <User Schedule>  diazepam  Injectable 10 milliGRAM(s) IV Push every 6 hours  enoxaparin Injectable 40 milliGRAM(s) SubCutaneous daily  folic acid 1 milliGRAM(s) Oral daily  metoprolol tartrate 12.5 milliGRAM(s) Oral two times a day  multivitamin 1 Tablet(s) Oral daily  pantoprazole  Injectable 40 milliGRAM(s) IV Push daily  thiamine IVPB 500 milliGRAM(s) IV Intermittent daily    MEDICATIONS  (PRN):  diazepam  Injectable 10 milliGRAM(s) IV Push every 1 hour PRN ciwa >8         Vitals log        ICU Vital Signs Last 24 Hrs  T(C): 36.7 (2021 00:00), Max: 36.7 (2021 17:30)  T(F): 98 (2021 00:00), Max: 98 (2021 17:30)  HR: 82 (2021 04:00) (82 - 116)  BP: 123/96 (2021 04:00) (105/83 - 147/87)  BP(mean): 106 (2021 04:00) (89 - 106)  ABP: --  ABP(mean): --  RR: 14 (2021 04:00) (11 - 22)  SpO2: 100% (2021 04:00) (97% - 100%)           Input and Output:  I&O's Detail      Lab Data                        11.6   6.89  )-----------( 63       ( 2021 20:04 )             34.2     11    132<L>  |  93<L>  |  11  ----------------------------<  125<H>  2.9<LL>   |  24  |  0.62    Ca    8.8      2021 20:00    TPro  8.3  /  Alb  4.0  /  TBili  1.2  /  DBili  x   /  AST  184<H>  /  ALT  92<H>  /  AlkPhos  130<H>  11            Review of Systems	  anxious  depressed      Objective     Physical Examination    heart s1s2  lung dec BS  abd soft  head nc  cn grossly  int  verbal  anxious  tremors      Pertinent Lab findings & Imaging      Yessenia:  NO   Adequate UO     I&O's Detail           Discussed with:     Cultures:	        Radiology  US pending -

## 2021-11-02 NOTE — PROGRESS NOTE ADULT - PROBLEM SELECTOR PLAN 2
requested ETOH detox, admitted to [facility], was started on CIWA protocol with IVP diazepam, thiamin & folic acid, monitor magnesium & phosphorous, CC/addiction medicine consult with Dr. Akhtar was called. Addiction medicine following  Continue CIWA protocol  Ativan PRN/Valium  Thiamine/FA/MVI supplementation

## 2021-11-02 NOTE — PROGRESS NOTE ADULT - PROBLEM SELECTOR PLAN 1
new onset, r/o ETOH withdrawal, but no reported change in drinking pattern, no history of head trauma, CT head without contrast was unremarkable, admitted to [facility], currently on Benzodiazepines for ETOH detox, hold her Escitalopram, seizure precautions, neurology consult with Dr. Fregoso was called. new onset, likely 2/2 ETOH use  CT head without contrast was unremarkable,   [facility]  Addition medicine input appreciated  hold her Escitalopram   seizure precautions  EEG as an outpt  neurology input appreciated, no indication for anti seizure meds. new onset, likely 2/2 ETOH use  CT head without contrast was unremarkable,   [facility]  Addition medicine input appreciated  seizure precautions  EEG as an outpt  neurology input appreciated, no indication for anti seizure meds.

## 2021-11-02 NOTE — BH CONSULTATION LIAISON ASSESSMENT NOTE - CURRENT MEDICATION
MEDICATIONS  (STANDING):  chlorhexidine 2% Cloths 1 Application(s) Topical <User Schedule>  diazepam    Tablet 5 milliGRAM(s) Oral every 4 hours  enoxaparin Injectable 40 milliGRAM(s) SubCutaneous daily  folic acid 1 milliGRAM(s) Oral daily  metoprolol tartrate 12.5 milliGRAM(s) Oral two times a day  multivitamin 1 Tablet(s) Oral daily  pantoprazole  Injectable 40 milliGRAM(s) IV Push daily  thiamine IVPB 500 milliGRAM(s) IV Intermittent daily    MEDICATIONS  (PRN):  diazepam    Tablet 5 milliGRAM(s) Oral every 4 hours PRN for ciwa > 5  LORazepam   Injectable 2 milliGRAM(s) IV Push every 15 minutes PRN for ciwa > 10 or Seizure activity

## 2021-11-02 NOTE — BH CONSULTATION LIAISON ASSESSMENT NOTE - HPI (INCLUDE ILLNESS QUALITY, SEVERITY, DURATION, TIMING, CONTEXT, MODIFYING FACTORS, ASSOCIATED SIGNS AND SYMPTOMS)
Patient seen, evaluated and chart reviewed. Patient is a 47 y/o MWF, no prior psychiatric hospitalizations, history of derpression, with PMH of HTN, who presented with a seizure episode witnessed by her  who reported seeing her falling to the ground with her jaw clenched, and hands shacking, then she recovered spontaneously after 5 min, no vomiting, or incontinence during the episode. Patient stated that she was "looking at the arnaud" and fell down backwards hitting her head. No history of seizures in the past. Patient with history of ETOH abuse, drinking every night, with her last drink was last night. On arrival to ED she was AAO X3, but with marked tremors, received Diazepam 10 mg IVP X1 dose, Ativan 1 mg IVP X2 doses, and Chlordiazepoxide 50 mg PO X1 dose, and was admitted to [facility] for ETOH detox. Patient admits to drinking increasingly more over the last several years and especially following the death of her father in July. She at this time denies acute symptoms of depression, elvis or psychosis. She is agreeable to an outpatient alcohol program.

## 2021-11-02 NOTE — BH CONSULTATION LIAISON ASSESSMENT NOTE - NS_RISKASSESSMENTINTER_PSY_ALL_CORE
Detail Level: Detailed Other (Free Text): Lesion initially resolved but has recurred.  Patient is very concerned.  Removal done today Note Text (......Xxx Chief Complaint.): This diagnosis correlates with the Low Acute Suicide Risk

## 2021-11-02 NOTE — PROGRESS NOTE ADULT - PROBLEM SELECTOR PLAN 4
mild, ML 2ry to Escitalopram use and ETOH abuse, unlikely to be the cause of her seizure given the level & lack of acuity, held Escitalopram already because of the seizure episode, monitor sodium level. Improved, likely 2/2 Escitalopram   Trend bmp Likely viral  Check stool for C diff and GI PCR Supplemented  Trend bmp

## 2021-11-02 NOTE — PROGRESS NOTE ADULT - PROBLEM SELECTOR PLAN 6
again related to ETOH abuse, no evidence suggestive of liver failure, will get an abdominal U/S in am. Abd us with hepatic steatosis Continue vitamin supplementation  Trend cbc Improved, likely 2/2 Escitalopram   Trend bmp

## 2021-11-02 NOTE — CONSULT NOTE ADULT - ASSESSMENT
This is a 45 y/o F with PMH of HTN, ETOH abuse, and Anxiety who presented with a seizure episode witnessed by her   Pt has No prior h/o seizures.  Pt has no tongue bite.  No urinary incontinence.  Non focal exam.  CT Head - No acute pathology.  In Alcohol withdrawal/DTs.   Also has electrolyte abn  Alcohol withdrawal seizure   Continue CIWA protocol  Ativan PRN/Valium  Thiamine/FA/MVI supplementation  No indication for anti seizure meds.  EEG as an out pt.  D/w Pt. Questions answered.  Counseling is done for complete alcohol cessation.  Fall/seizure precautions at all times.  D/w covering nurse.  Would continue to follow.

## 2021-11-02 NOTE — BH CONSULTATION LIAISON ASSESSMENT NOTE - NSBHCHARTREVIEWVS_PSY_A_CORE FT
Vital Signs Last 24 Hrs  T(C): 36.2 (02 Nov 2021 08:00), Max: 36.8 (02 Nov 2021 06:00)  T(F): 97.2 (02 Nov 2021 08:00), Max: 98.2 (02 Nov 2021 06:00)  HR: 77 (02 Nov 2021 08:00) (77 - 116)  BP: 153/89 (02 Nov 2021 08:00) (105/83 - 153/89)  BP(mean): 110 (02 Nov 2021 08:00) (89 - 110)  RR: 15 (02 Nov 2021 08:00) (11 - 22)  SpO2: 96% (02 Nov 2021 08:00) (96% - 100%)

## 2021-11-03 LAB
ALBUMIN SERPL ELPH-MCNC: 3 G/DL — LOW (ref 3.3–5)
ALP SERPL-CCNC: 96 U/L — SIGNIFICANT CHANGE UP (ref 30–120)
ALT FLD-CCNC: 56 U/L DA — SIGNIFICANT CHANGE UP (ref 10–60)
ANION GAP SERPL CALC-SCNC: 10 MMOL/L — SIGNIFICANT CHANGE UP (ref 5–17)
AST SERPL-CCNC: 96 U/L — HIGH (ref 10–40)
BASOPHILS # BLD AUTO: 0.04 K/UL — SIGNIFICANT CHANGE UP (ref 0–0.2)
BASOPHILS NFR BLD AUTO: 0.8 % — SIGNIFICANT CHANGE UP (ref 0–2)
BILIRUB SERPL-MCNC: 0.6 MG/DL — SIGNIFICANT CHANGE UP (ref 0.2–1.2)
BUN SERPL-MCNC: 7 MG/DL — SIGNIFICANT CHANGE UP (ref 7–23)
C DIFF BY PCR RESULT: SIGNIFICANT CHANGE UP
C DIFF TOX GENS STL QL NAA+PROBE: SIGNIFICANT CHANGE UP
CALCIUM SERPL-MCNC: 8.3 MG/DL — LOW (ref 8.4–10.5)
CHLORIDE SERPL-SCNC: 103 MMOL/L — SIGNIFICANT CHANGE UP (ref 96–108)
CO2 SERPL-SCNC: 24 MMOL/L — SIGNIFICANT CHANGE UP (ref 22–31)
CREAT SERPL-MCNC: 0.41 MG/DL — LOW (ref 0.5–1.3)
EOSINOPHIL # BLD AUTO: 0.14 K/UL — SIGNIFICANT CHANGE UP (ref 0–0.5)
EOSINOPHIL NFR BLD AUTO: 2.9 % — SIGNIFICANT CHANGE UP (ref 0–6)
GLUCOSE SERPL-MCNC: 97 MG/DL — SIGNIFICANT CHANGE UP (ref 70–99)
HCT VFR BLD CALC: 31.6 % — LOW (ref 34.5–45)
HGB BLD-MCNC: 10.4 G/DL — LOW (ref 11.5–15.5)
IMM GRANULOCYTES NFR BLD AUTO: 0.4 % — SIGNIFICANT CHANGE UP (ref 0–1.5)
LYMPHOCYTES # BLD AUTO: 1.13 K/UL — SIGNIFICANT CHANGE UP (ref 1–3.3)
LYMPHOCYTES # BLD AUTO: 23.3 % — SIGNIFICANT CHANGE UP (ref 13–44)
MAGNESIUM SERPL-MCNC: 1.5 MG/DL — LOW (ref 1.6–2.6)
MCHC RBC-ENTMCNC: 32.9 GM/DL — SIGNIFICANT CHANGE UP (ref 32–36)
MCHC RBC-ENTMCNC: 37 PG — HIGH (ref 27–34)
MCV RBC AUTO: 112.5 FL — HIGH (ref 80–100)
MONOCYTES # BLD AUTO: 0.73 K/UL — SIGNIFICANT CHANGE UP (ref 0–0.9)
MONOCYTES NFR BLD AUTO: 15.1 % — HIGH (ref 2–14)
NEUTROPHILS # BLD AUTO: 2.79 K/UL — SIGNIFICANT CHANGE UP (ref 1.8–7.4)
NEUTROPHILS NFR BLD AUTO: 57.5 % — SIGNIFICANT CHANGE UP (ref 43–77)
NRBC # BLD: 0 /100 WBCS — SIGNIFICANT CHANGE UP (ref 0–0)
PHOSPHATE SERPL-MCNC: 4.7 MG/DL — HIGH (ref 2.5–4.5)
PLATELET # BLD AUTO: 61 K/UL — LOW (ref 150–400)
POTASSIUM SERPL-MCNC: 3.4 MMOL/L — LOW (ref 3.5–5.3)
POTASSIUM SERPL-SCNC: 3.4 MMOL/L — LOW (ref 3.5–5.3)
PROT SERPL-MCNC: 6.7 G/DL — SIGNIFICANT CHANGE UP (ref 6–8.3)
RBC # BLD: 2.81 M/UL — LOW (ref 3.8–5.2)
RBC # FLD: 13.2 % — SIGNIFICANT CHANGE UP (ref 10.3–14.5)
SODIUM SERPL-SCNC: 137 MMOL/L — SIGNIFICANT CHANGE UP (ref 135–145)
WBC # BLD: 4.85 K/UL — SIGNIFICANT CHANGE UP (ref 3.8–10.5)
WBC # FLD AUTO: 4.85 K/UL — SIGNIFICANT CHANGE UP (ref 3.8–10.5)

## 2021-11-03 PROCEDURE — 99233 SBSQ HOSP IP/OBS HIGH 50: CPT

## 2021-11-03 RX ORDER — MAGNESIUM SULFATE 500 MG/ML
2 VIAL (ML) INJECTION ONCE
Refills: 0 | Status: COMPLETED | OUTPATIENT
Start: 2021-11-03 | End: 2021-11-03

## 2021-11-03 RX ORDER — POTASSIUM CHLORIDE 20 MEQ
20 PACKET (EA) ORAL
Refills: 0 | Status: COMPLETED | OUTPATIENT
Start: 2021-11-03 | End: 2021-11-03

## 2021-11-03 RX ADMIN — CHLORHEXIDINE GLUCONATE 1 APPLICATION(S): 213 SOLUTION TOPICAL at 05:58

## 2021-11-03 RX ADMIN — Medication 5 MILLIGRAM(S): at 02:04

## 2021-11-03 RX ADMIN — ENOXAPARIN SODIUM 40 MILLIGRAM(S): 100 INJECTION SUBCUTANEOUS at 11:30

## 2021-11-03 RX ADMIN — Medication 5 MILLIGRAM(S): at 21:52

## 2021-11-03 RX ADMIN — Medication 12.5 MILLIGRAM(S): at 05:57

## 2021-11-03 RX ADMIN — Medication 5 MILLIGRAM(S): at 17:44

## 2021-11-03 RX ADMIN — Medication 5 MILLIGRAM(S): at 14:01

## 2021-11-03 RX ADMIN — Medication 50 GRAM(S): at 11:31

## 2021-11-03 RX ADMIN — Medication 5 MILLIGRAM(S): at 05:58

## 2021-11-03 RX ADMIN — Medication 5 MILLIGRAM(S): at 09:56

## 2021-11-03 RX ADMIN — Medication 105 MILLIGRAM(S): at 12:57

## 2021-11-03 RX ADMIN — PANTOPRAZOLE SODIUM 40 MILLIGRAM(S): 20 TABLET, DELAYED RELEASE ORAL at 11:30

## 2021-11-03 RX ADMIN — Medication 1 MILLIGRAM(S): at 11:30

## 2021-11-03 RX ADMIN — Medication 12.5 MILLIGRAM(S): at 17:44

## 2021-11-03 RX ADMIN — SERTRALINE 50 MILLIGRAM(S): 25 TABLET, FILM COATED ORAL at 11:30

## 2021-11-03 RX ADMIN — Medication 20 MILLIEQUIVALENT(S): at 14:00

## 2021-11-03 RX ADMIN — Medication 1 TABLET(S): at 11:30

## 2021-11-03 RX ADMIN — Medication 20 MILLIEQUIVALENT(S): at 11:30

## 2021-11-03 NOTE — PROGRESS NOTE ADULT - SUBJECTIVE AND OBJECTIVE BOX
Neurology Follow up note    RELL BERNAL 46y Female    HPI: This is a 47 y/o F with PMH of HTN, ETOH abuse, and Anxiety who presented with a seizure episode witnessed by her  who reported seeing her falling to the ground with her jaw clenched, and hands shacking, then she recovered spontaneously after 5 min, no vomiting, or incontinence during the episode. Patient stated that she was "looking at the arnaud" and fell down backwards hitting her head. No history of seizures in the past. Patient with history of ETOH abuse, drinking every night, with her last drink was last night. On arrival to ED she was AAO X3, but with marked tremors, received Diazepam 10 mg IVP X1 dose, Ativan 1 mg IVP X2 doses, and Chlordiazepoxide 50 mg PO X1 dose, and was admitted to [facility] for ETOH detox.      (01 Nov 2021 23:49)  Currently is being monitored in [facility].  AAOX3, Shaky/Tremulous.  No further seizure reported    Interval History -    Patient is seen, chart was reviewed and case was discussed with the treatment team.  Sitting on chair comfortably.   No further seizure is reported.    Vital Signs Last 24 Hrs  T(C): 36.7 (03 Nov 2021 12:50), Max: 36.7 (02 Nov 2021 16:09)  T(F): 98 (03 Nov 2021 12:50), Max: 98.1 (02 Nov 2021 16:09)  HR: 92 (03 Nov 2021 14:01) (66 - 93)  BP: 106/70 (03 Nov 2021 14:01) (99/70 - 124/92)  BP(mean): 81 (03 Nov 2021 14:01) (80 - 104)  RR: 15 (03 Nov 2021 14:01) (11 - 18)  SpO2: 99% (03 Nov 2021 14:01) (95% - 100%)    MEDICATIONS    chlorhexidine 2% Cloths 1 Application(s) Topical <User Schedule>  diazepam    Tablet 5 milliGRAM(s) Oral every 4 hours  diazepam    Tablet 5 milliGRAM(s) Oral every 4 hours PRN  enoxaparin Injectable 40 milliGRAM(s) SubCutaneous daily  folic acid 1 milliGRAM(s) Oral daily  LORazepam   Injectable 2 milliGRAM(s) IV Push every 15 minutes PRN  metoprolol tartrate 12.5 milliGRAM(s) Oral two times a day  multivitamin 1 Tablet(s) Oral daily  pantoprazole  Injectable 40 milliGRAM(s) IV Push daily  sertraline 50 milliGRAM(s) Oral daily  thiamine IVPB 500 milliGRAM(s) IV Intermittent daily    Allergies    No Known Allergies    REVIEW OF SYSTEMS:    CONSTITUTIONAL: No fever  EYES: No eye pain,   ENMT:  No sinus or throat pain  NECK: No pain or stiffness  RESPIRATORY: No cough, No hemoptysis; No shortness of breath  CARDIOVASCULAR: No acute chest pain, palpitations,  or leg swelling  GASTROINTESTINAL: No abdominal pain. No nausea, vomiting, or hematemesis;  No melena or hematochezia.  GENITOURINARY: No  hematuria, or incontinence  MUSCULOSKELETAL: No joint swelling; No extremity pain  SKIN: No itching, rashes, or lesions   LYMPH NODES: No enlarged glands  NEUROLOGICAL: No headaches, memory loss, Currently shaky/Tremulous. No h/o prior seizures  PSYCHIATRIC: H/o depression/anxiety  ENDOCRINE: No heat or cold intolerance;   HEME/LYMPH: No easy bruising, or bleeding gums  Allergy/Immunology. No medication allergy. No seasonal allergies.    PHYSICAL EXAM:    GENERAL: NAD, well-groomed, well-developed  HEAD:  Atraumatic, Normocephalic  EYES: EOMI, PERRLA, conjunctiva and sclera clear  NECK: Supple, No JVD, thyroid non-palpable    On Neurological Examination:    Mental Status - Pt is alert, awake, oriented X3. Higher functions are intact. Follows commands well and able to answer questions appropriately.    Speech -  Normal. Pt has no aphasia.    Cranial Nerves - Pupils 3 mm equal and reactive to light. No facial asymmetry. Tongue - is in midline.    Motor Exam - 4 plus/5 all over, No drift. Shaky/tremulous      Sensory Exam - Pt withdraws all extremities equally on stimulation.     Gait - Pt is able to stand up with holding my hands.     Deep tendon Reflexes - 2 plus all over.    Coordination - Fine finger movements and  Finger to nose normal except that pt is shaky.       Romberg - Negative.    Neck Supple -  Yes.    LABS:    CBC Full  -  ( 03 Nov 2021 06:18 )  WBC Count : 4.85 K/uL  RBC Count : 2.81 M/uL  Hemoglobin : 10.4 g/dL  Hematocrit : 31.6 %  Platelet Count - Automated : 61 K/uL  Mean Cell Volume : 112.5 fl  Mean Cell Hemoglobin : 37.0 pg  Mean Cell Hemoglobin Concentration : 32.9 gm/dL  Auto Neutrophil # : 2.79 K/uL  Auto Lymphocyte # : 1.13 K/uL  Auto Monocyte # : 0.73 K/uL  Auto Eosinophil # : 0.14 K/uL  Auto Basophil # : 0.04 K/uL  Auto Neutrophil % : 57.5 %  Auto Lymphocyte % : 23.3 %  Auto Monocyte % : 15.1 %  Auto Eosinophil % : 2.9 %  Auto Basophil % : 0.8 %    11-03    137  |  103  |  7   ----------------------------<  97  3.4<L>   |  24  |  0.41<L>    Ca    8.3<L>      03 Nov 2021 06:18  Phos  4.7     11-03  Mg     1.5     11-03    TPro  6.7  /  Alb  3.0<L>  /  TBili  0.6  /  DBili  x   /  AST  96<H>  /  ALT  56  /  AlkPhos  96  11-03    Vitamin B12, Serum: 1752 pg/mL (11-02 @ 21:36)    RADIOLOGY & ADDITIONAL STUDIES:    < from: CT Head No Cont (11.01.21 @ 20:26) >    No acute intracranial findings.    < end of copied text >    < from: US Abdomen Complete (US Abdomen Complete .) (11.02.21 @ 09:10) >    Fatty liver. Otherwise unremarkable study.    < end of copied text >

## 2021-11-03 NOTE — PROGRESS NOTE ADULT - SUBJECTIVE AND OBJECTIVE BOX
Date/Time Patient Seen:  		  Referring MD:   Data Reviewed	       Patient is a 46y old  Female who presents with a chief complaint of Seizures (2021 20:00)      Subjective/HPI     PAST MEDICAL & SURGICAL HISTORY:  HTN (hypertension)    HTN (hypertension)    Anxiety    H/O  section          Medication list         MEDICATIONS  (STANDING):  chlorhexidine 2% Cloths 1 Application(s) Topical <User Schedule>  diazepam    Tablet 5 milliGRAM(s) Oral every 4 hours  enoxaparin Injectable 40 milliGRAM(s) SubCutaneous daily  folic acid 1 milliGRAM(s) Oral daily  metoprolol tartrate 12.5 milliGRAM(s) Oral two times a day  multivitamin 1 Tablet(s) Oral daily  pantoprazole  Injectable 40 milliGRAM(s) IV Push daily  sertraline 50 milliGRAM(s) Oral daily  thiamine IVPB 500 milliGRAM(s) IV Intermittent daily    MEDICATIONS  (PRN):  diazepam    Tablet 5 milliGRAM(s) Oral every 4 hours PRN for ciwa > 5  LORazepam   Injectable 2 milliGRAM(s) IV Push every 15 minutes PRN for ciwa > 10 or Seizure activity         Vitals log        ICU Vital Signs Last 24 Hrs  T(C): 36.6 (2021 00:50), Max: 36.7 (2021 16:09)  T(F): 97.8 (2021 00:50), Max: 98.1 (2021 16:09)  HR: 66 (2021 06:00) (66 - 94)  BP: 120/81 (2021 06:00) (96/76 - 153/89)  BP(mean): 93 (2021 06:00) (80 - 130)  ABP: --  ABP(mean): --  RR: 12 (2021 06:00) (12 - 20)  SpO2: 97% (2021 00:50) (95% - 100%)           Input and Output:  I&O's Detail    2021 07:01  -  2021 07:00  --------------------------------------------------------  IN:    Oral Fluid: 600 mL  Total IN: 600 mL    OUT:    Voided (mL): 600 mL  Total OUT: 600 mL    Total NET: 0 mL          Lab Data                        10.4   4.85  )-----------( 61       ( 2021 06:18 )             31.6     11-03    137  |  103  |  7   ----------------------------<  97  3.4<L>   |  24  |  0.41<L>    Ca    8.3<L>      2021 06:18  Phos  4.7     11  Mg     1.5     11    TPro  6.7  /  Alb  3.0<L>  /  TBili  0.6  /  DBili  x   /  AST  96<H>  /  ALT  56  /  AlkPhos  96  11-03      CARDIAC MARKERS ( 2021 12:47 )  x     / x     / 136 U/L / x     / x            Review of Systems	      Objective     Physical Examination    heart s1s2  lung dec BS  abd soft  head nc  verbal  alert      Pertinent Lab findings & Imaging      Yessenia:  NO   Adequate UO     I&O's Detail    2021 07:01  -  2021 07:00  --------------------------------------------------------  IN:    Oral Fluid: 600 mL  Total IN: 600 mL    OUT:    Voided (mL): 600 mL  Total OUT: 600 mL    Total NET: 0 mL               Discussed with:     Cultures:	        Radiology

## 2021-11-03 NOTE — DIETITIAN INITIAL EVALUATION ADULT. - PROBLEM SELECTOR PLAN 4
mild, ML 2ry to Escitalopram use and ETOH abuse, unlikely to be the cause of her seizure given the level & lack of acuity, held Escitalopram already because of the seizure episode, monitor sodium level.

## 2021-11-03 NOTE — PROGRESS NOTE ADULT - ASSESSMENT
This is a 47 y/o F with PMH of HTN, ETOH abuse, and Anxiety who presented with a seizure episode witnessed by her   Pt has No prior h/o seizures.  Pt has no tongue bite.  No urinary incontinence.  Non focal exam.  CT Head - No acute pathology.  In Alcohol withdrawal/DTs.   Also has electrolyte abn  -corrected.  Alcohol withdrawal seizure   On CIWA protocol  Ativan PRN/Valium  Thiamine/FA/MVI supplementation.  Getting w/up to r/o C-Diff.  No indication for anti seizure meds.  D/w pt to get EEG as an out pt. Questions answered.  Counseling is done for complete alcohol cessation on daily basis. Pt understands and agrees.  Fall/seizure precautions at all times.  D/w covering nurse.  Would continue to follow.

## 2021-11-03 NOTE — DIETITIAN INITIAL EVALUATION ADULT. - PERTINENT MEDS FT
MEDICATIONS  (STANDING):  chlorhexidine 2% Cloths 1 Application(s) Topical <User Schedule>  diazepam    Tablet 5 milliGRAM(s) Oral every 4 hours  enoxaparin Injectable 40 milliGRAM(s) SubCutaneous daily  folic acid 1 milliGRAM(s) Oral daily  metoprolol tartrate 12.5 milliGRAM(s) Oral two times a day  multivitamin 1 Tablet(s) Oral daily  pantoprazole  Injectable 40 milliGRAM(s) IV Push daily  potassium chloride    Tablet ER 20 milliEquivalent(s) Oral every 2 hours  sertraline 50 milliGRAM(s) Oral daily  thiamine IVPB 500 milliGRAM(s) IV Intermittent daily    MEDICATIONS  (PRN):  diazepam    Tablet 5 milliGRAM(s) Oral every 4 hours PRN for ciwa > 5  LORazepam   Injectable 2 milliGRAM(s) IV Push every 15 minutes PRN for ciwa > 10 or Seizure activity

## 2021-11-03 NOTE — PROGRESS NOTE ADULT - SUBJECTIVE AND OBJECTIVE BOX
Subjective: Patient seen and examined. No overnight events. Tearful and emotional.  Wants help to get her control of her life.     MEDICATIONS  (STANDING):  chlorhexidine 2% Cloths 1 Application(s) Topical <User Schedule>  diazepam    Tablet 5 milliGRAM(s) Oral every 4 hours  enoxaparin Injectable 40 milliGRAM(s) SubCutaneous daily  folic acid 1 milliGRAM(s) Oral daily  metoprolol tartrate 12.5 milliGRAM(s) Oral two times a day  multivitamin 1 Tablet(s) Oral daily  pantoprazole  Injectable 40 milliGRAM(s) IV Push daily  sertraline 50 milliGRAM(s) Oral daily  thiamine IVPB 500 milliGRAM(s) IV Intermittent daily    MEDICATIONS  (PRN):  diazepam    Tablet 5 milliGRAM(s) Oral every 4 hours PRN for ciwa > 5  LORazepam   Injectable 2 milliGRAM(s) IV Push every 15 minutes PRN for ciwa > 10 or Seizure activity      Allergies    No Known Allergies    Intolerances        Vital Signs Last 24 Hrs  T(C): 36.6 (03 Nov 2021 08:30), Max: 36.7 (02 Nov 2021 16:09)  T(F): 97.9 (03 Nov 2021 08:30), Max: 98.1 (02 Nov 2021 16:09)  HR: 66 (03 Nov 2021 08:00) (66 - 94)  BP: 111/79 (03 Nov 2021 08:00) (96/76 - 124/92)  BP(mean): 88 (03 Nov 2021 08:00) (80 - 104)  RR: 11 (03 Nov 2021 08:00) (11 - 19)  SpO2: 97% (03 Nov 2021 00:50) (95% - 100%)    PHYSICAL EXAM:  GENERAL: NAD, well-groomed, well-developed  HEAD:  Atraumatic, Normocephalic  ENMT: Moist mucous membranes,   NECK: Supple, No JVD, Normal thyroid  NERVOUS SYSTEM:  All 4 extremities mobile, no gross sensory deficits.   CHEST/LUNG: Clear to auscultation bilaterally; No rales, rhonchi, wheezing, or rubs  HEART: Regular rate and rhythm; No murmurs, rubs, or gallops  ABDOMEN: Soft, Nontender, Nondistended; Bowel sounds present  EXTREMITIES:  2+ Peripheral Pulses, No clubbing, cyanosis, or edema      LABS:                        10.4   4.85  )-----------( 61       ( 03 Nov 2021 06:18 )             31.6     03 Nov 2021 06:18    137    |  103    |  7      ----------------------------<  97     3.4     |  24     |  0.41     Ca    8.3        03 Nov 2021 06:18  Phos  4.7       03 Nov 2021 06:18  Mg     1.5       03 Nov 2021 06:18    TPro  6.7    /  Alb  3.0    /  TBili  0.6    /  DBili  x      /  AST  96     /  ALT  56     /  AlkPhos  96     03 Nov 2021 06:18    PT/INR - ( 01 Nov 2021 20:04 )   PT: 12.1 sec;   INR: 1.00 ratio         PTT - ( 01 Nov 2021 20:04 )  PTT:27.3 sec    CAPILLARY BLOOD GLUCOSE          RADIOLOGY & ADDITIONAL TESTS:    Imaging Personally Reviewed:  [ ] YES     Consultant(s) Notes Reviewed:      Care Discussed with Consultants/Other Providers:    Advanced Directives: [ ] DNR  [ ] No feeding tube  [ ] MOLST in chart  [ ] MOLST completed today  [ ] Unknown

## 2021-11-03 NOTE — PROGRESS NOTE ADULT - ASSESSMENT
46F with HTN, Anxiety, Depression and Alcohol Abuse admitted for Seizure episode     Seizure Episode - Most likely related to EtOH Withdrawal as patient didn't drink for 2 days prior due to work and then drank less than usual 24 hours prior to event. Counseled on EtOH cessation.  Neurology consult noted.    EtOH Abuse / Withdrawal - CIWA with Ativan/Valium.  MVI + Thiamine + Folic Acid.  EtOH cessation and behavioral health counseling advised.     Anxiety / Depression - Holding Lexapro.  Psych consulted and following     HTN - Controlled. Continue Metoprolol     Diet - Regular    DVT Prophylaxis - Lovenox     Disposition - Currently in ICU for active management of EtOH withdrawal.  Patient will need referral for EtOH cessation programs.   Have advised her to find a therapist which she is doing.

## 2021-11-03 NOTE — DIETITIAN INITIAL EVALUATION ADULT. - PROBLEM SELECTOR PLAN 2
requested ETOH detox, admitted to [facility], was started on CIWA protocol with IVP diazepam, thiamin & folic acid, monitor magnesium & phosphorous, CC/addiction medicine consult with Dr. Akhtar was called.

## 2021-11-03 NOTE — PROGRESS NOTE ADULT - ASSESSMENT
45 y/o F with PMH of HTN, ETOH abuse, and Anxiety presented with a seizure episode.    am K noted - replete lytes - monitor labs  vs noted    drinker - non smoker - anxious - depressed - grieving (lost her father)    valium ATC with hold parameters  valium PRN as per CIWA  ativan PRN as per CIWA and for Seizure activity  education - counseling - emotional support  may need Psych eval on this admission  planned for US abd - LFTs noted - surveillance  [facility] monitoring in progress  hydration  AA referral

## 2021-11-03 NOTE — DIETITIAN INITIAL EVALUATION ADULT. - OTHER INFO
Per H&P, Pt is a 47 y/o F with PMHx of HTN, ETOH abuse, and Anxiety who presented with a seizure episode witnessed by her  who reported seeing her falling to the ground with her jaw clenched, and hands shacking, then she recovered spontaneously after 5 min, no vomiting, or incontinence during the episode. Patient stated that she was "looking at the arnaud" and fell down backwards hitting her head. No history of seizures in the past. Patient with history of ETOH abuse, drinking every night, with her last drink was last night. On arrival to ED she was AAO X3, but with marked tremors, received Diazepam 10 mg IVP X1 dose, Ativan 1 mg IVP X2 doses, and Chlordiazepoxide 50 mg PO X1 dose, and was admitted to [facility] for ETOH detox.       Pt seen for nutrition assessment secondary to SCU length of stay policy. Pt currently in ICU for active management of EtOH withdrawal. Pt visited this afternoon OOB to chair, pt's niece at bedside. Pt reports good appetite. No PO intakes documented thus far. Pt feeds self, reports no chewing/swallowing difficulties. No food allergies. Denies N/V/D/C. Last BM yesterday 11/2 per pt report. UBW ~130#. Pt states that she lost approx. 15# over the course of the past two years due to family member becoming ill. This indicates 10% wt loss x 2 years (non significant). CBW on admission 134#. No edema noted. Skin intact. PTA pt states that she typically has a good appetite, consumes ~3 small balanced meals/day, prepares her own meals. Pt states that she is very picky eater, typical intake consists of B- yogurt, smoothie, granola, banana, L- hummus, pretzels, fresh fruit, D- organic chicken/fish, fresh vegetables, rice. Pt takes MVI, Mg, K and iron supplementation at home. Pt presently on regular diet. Obtained some of pt's food preferences during time of visit, diet office informed. Meal preferences to be obtained daily to optimize PO intake and tolerance. Recommend continuing KCl, MVI, folic acid, and thiamin supplementation during hospital stay. RD will continue to follow-up and monitor pt's nutritional status. [Nausea] : nausea [Recent Weight Gain (___ Lbs)] : no recent weight gain [Recent Weight Loss (___ Lbs)] : no recent weight loss [Chest Pain] : no chest pain [Shortness Of Breath] : no shortness of breath [Polyuria] : no polyuria [Joint Pain] : no joint pain [Ulcer] : no ulcer [Pain/Numbness of Digits] : no pain/numbness of digits [Insomnia] : no insomnia [Polydipsia] : no polydipsia [FreeTextEntry3] : No known DR Per H&P, Pt is a 45 y/o F with PMHx of HTN, ETOH abuse, and Anxiety who presented with a seizure episode witnessed by her  who reported seeing her falling to the ground with her jaw clenched, and hands shacking, then she recovered spontaneously after 5 min, no vomiting, or incontinence during the episode. Patient stated that she was "looking at the arnaud" and fell down backwards hitting her head. No history of seizures in the past. Patient with history of ETOH abuse, drinking every night, with her last drink was last night. On arrival to ED she was AAO X3, but with marked tremors, received Diazepam 10 mg IVP X1 dose, Ativan 1 mg IVP X2 doses, and Chlordiazepoxide 50 mg PO X1 dose, and was admitted to [facility] for ETOH detox.       Pt seen for nutrition assessment secondary to SCU length of stay policy. Pt currently in ICU for active management of EtOH withdrawal. Pt visited this afternoon OOB to chair, pt's niece at bedside. Pt reports good appetite. No PO intakes documented thus far. Pt feeds self, reports no chewing/swallowing difficulties. No food allergies. Denies N/V/D/C. Last BM yesterday 11/2 per pt report. UBW ~130#. Pt states that she lost approx. 15# over the course of the past two years due to depression from loss of her father. This indicates 10% wt loss x 2 years (non significant). CBW on admission 134#. No edema noted. Skin intact. PTA pt states that she typically has a good appetite, consumes ~3 small balanced meals/day, prepares her own meals. Pt states that she is very picky eater, typical intake consists of B- yogurt, smoothie, granola, banana, L- hummus, pretzels, fresh fruit, D- organic chicken/fish, fresh vegetables, rice. Pt takes MVI, Mg, K and iron supplementation at home. Pt presently on regular diet. Obtained some of pt's food preferences during time of visit, diet office informed. Meal preferences to be obtained daily to optimize PO intake and tolerance. Recommend continuing KCl, MVI, folic acid, and thiamin supplementation during hospital stay. RD will continue to follow-up and monitor pt's nutritional status.

## 2021-11-04 LAB
ALBUMIN SERPL ELPH-MCNC: 3.1 G/DL — LOW (ref 3.3–5)
ALP SERPL-CCNC: 92 U/L — SIGNIFICANT CHANGE UP (ref 30–120)
ALT FLD-CCNC: 59 U/L DA — SIGNIFICANT CHANGE UP (ref 10–60)
ANION GAP SERPL CALC-SCNC: 9 MMOL/L — SIGNIFICANT CHANGE UP (ref 5–17)
AST SERPL-CCNC: 91 U/L — HIGH (ref 10–40)
BILIRUB SERPL-MCNC: 0.4 MG/DL — SIGNIFICANT CHANGE UP (ref 0.2–1.2)
BUN SERPL-MCNC: 9 MG/DL — SIGNIFICANT CHANGE UP (ref 7–23)
CALCIUM SERPL-MCNC: 8.7 MG/DL — SIGNIFICANT CHANGE UP (ref 8.4–10.5)
CHLORIDE SERPL-SCNC: 102 MMOL/L — SIGNIFICANT CHANGE UP (ref 96–108)
CO2 SERPL-SCNC: 26 MMOL/L — SIGNIFICANT CHANGE UP (ref 22–31)
CREAT SERPL-MCNC: 0.37 MG/DL — LOW (ref 0.5–1.3)
GLUCOSE SERPL-MCNC: 91 MG/DL — SIGNIFICANT CHANGE UP (ref 70–99)
POTASSIUM SERPL-MCNC: 4 MMOL/L — SIGNIFICANT CHANGE UP (ref 3.5–5.3)
POTASSIUM SERPL-SCNC: 4 MMOL/L — SIGNIFICANT CHANGE UP (ref 3.5–5.3)
PROT SERPL-MCNC: 6.9 G/DL — SIGNIFICANT CHANGE UP (ref 6–8.3)
SODIUM SERPL-SCNC: 137 MMOL/L — SIGNIFICANT CHANGE UP (ref 135–145)

## 2021-11-04 PROCEDURE — 99232 SBSQ HOSP IP/OBS MODERATE 35: CPT

## 2021-11-04 RX ORDER — ACETAMINOPHEN 500 MG
650 TABLET ORAL EVERY 6 HOURS
Refills: 0 | Status: DISCONTINUED | OUTPATIENT
Start: 2021-11-04 | End: 2021-11-05

## 2021-11-04 RX ORDER — DIAZEPAM 5 MG
2 TABLET ORAL EVERY 6 HOURS
Refills: 0 | Status: DISCONTINUED | OUTPATIENT
Start: 2021-11-04 | End: 2021-11-05

## 2021-11-04 RX ADMIN — Medication 650 MILLIGRAM(S): at 21:15

## 2021-11-04 RX ADMIN — Medication 1 TABLET(S): at 11:34

## 2021-11-04 RX ADMIN — Medication 12.5 MILLIGRAM(S): at 06:17

## 2021-11-04 RX ADMIN — Medication 5 MILLIGRAM(S): at 06:16

## 2021-11-04 RX ADMIN — Medication 1 MILLIGRAM(S): at 11:34

## 2021-11-04 RX ADMIN — Medication 650 MILLIGRAM(S): at 20:20

## 2021-11-04 RX ADMIN — Medication 2 MILLIGRAM(S): at 11:34

## 2021-11-04 RX ADMIN — Medication 2 MILLIGRAM(S): at 23:49

## 2021-11-04 RX ADMIN — Medication 12.5 MILLIGRAM(S): at 17:15

## 2021-11-04 RX ADMIN — SERTRALINE 50 MILLIGRAM(S): 25 TABLET, FILM COATED ORAL at 11:34

## 2021-11-04 RX ADMIN — ENOXAPARIN SODIUM 40 MILLIGRAM(S): 100 INJECTION SUBCUTANEOUS at 11:34

## 2021-11-04 RX ADMIN — Medication 105 MILLIGRAM(S): at 11:38

## 2021-11-04 RX ADMIN — Medication 5 MILLIGRAM(S): at 02:15

## 2021-11-04 RX ADMIN — PANTOPRAZOLE SODIUM 40 MILLIGRAM(S): 20 TABLET, DELAYED RELEASE ORAL at 11:34

## 2021-11-04 RX ADMIN — Medication 2 MILLIGRAM(S): at 17:15

## 2021-11-04 NOTE — PROGRESS NOTE ADULT - SUBJECTIVE AND OBJECTIVE BOX
Subjective: Doing well with no overnight events.  CIWA <5 and patient is motivated to quit drinking.     MEDICATIONS  (STANDING):  diazepam    Tablet 2 milliGRAM(s) Oral every 6 hours  enoxaparin Injectable 40 milliGRAM(s) SubCutaneous daily  folic acid 1 milliGRAM(s) Oral daily  metoprolol tartrate 12.5 milliGRAM(s) Oral two times a day  multivitamin 1 Tablet(s) Oral daily  pantoprazole  Injectable 40 milliGRAM(s) IV Push daily  sertraline 50 milliGRAM(s) Oral daily  thiamine IVPB 500 milliGRAM(s) IV Intermittent daily    MEDICATIONS  (PRN):  diazepam    Tablet 5 milliGRAM(s) Oral every 4 hours PRN for ciwa > 5  LORazepam   Injectable 2 milliGRAM(s) IV Push every 15 minutes PRN for ciwa > 10 or Seizure activity      Allergies    No Known Allergies    Intolerances        Vital Signs Last 24 Hrs  T(C): 36.1 (04 Nov 2021 08:30), Max: 36.7 (03 Nov 2021 12:50)  T(F): 97 (04 Nov 2021 08:30), Max: 98 (03 Nov 2021 12:50)  HR: 72 (04 Nov 2021 08:00) (60 - 92)  BP: 109/71 (04 Nov 2021 08:00) (103/69 - 116/75)  BP(mean): 82 (04 Nov 2021 08:00) (80 - 94)  RR: 14 (04 Nov 2021 08:00) (10 - 22)  SpO2: 95% (04 Nov 2021 08:00) (95% - 100%)    PHYSICAL EXAM:  GENERAL: NAD, well-groomed, well-developed  HEAD:  Atraumatic, Normocephalic  ENMT: Moist mucous membranes,   NECK: Supple, No JVD, Normal thyroid  NERVOUS SYSTEM:  All 4 extremities mobile, no gross sensory deficits.   CHEST/LUNG: Clear to auscultation bilaterally; No rales, rhonchi, wheezing, or rubs  HEART: Regular rate and rhythm; No murmurs, rubs, or gallops  ABDOMEN: Soft, Nontender, Nondistended; Bowel sounds present  EXTREMITIES:  2+ Peripheral Pulses, No clubbing, cyanosis, or edema      LABS:    04 Nov 2021 07:01    137    |  102    |  9      ----------------------------<  91     4.0     |  26     |  0.37     Ca    8.7        04 Nov 2021 07:01    TPro  6.9    /  Alb  3.1    /  TBili  0.4    /  DBili  x      /  AST  91     /  ALT  59     /  AlkPhos  92     04 Nov 2021 07:01        CAPILLARY BLOOD GLUCOSE          RADIOLOGY & ADDITIONAL TESTS:    Imaging Personally Reviewed:  [ ] YES     Consultant(s) Notes Reviewed:      Care Discussed with Consultants/Other Providers:    Advanced Directives: [ ] DNR  [ ] No feeding tube  [ ] MOLST in chart  [ ] MOLST completed today  [ ] Unknown

## 2021-11-04 NOTE — PROGRESS NOTE ADULT - SUBJECTIVE AND OBJECTIVE BOX
Neurology Follow up note    RELL BERNAL 46y Female    HPI: This is a 47 y/o F with PMH of HTN, ETOH abuse, and Anxiety who presented with a seizure episode witnessed by her  who reported seeing her falling to the ground with her jaw clenched, and hands shacking, then she recovered spontaneously after 5 min, no vomiting, or incontinence during the episode. Patient stated that she was "looking at the arnaud" and fell down backwards hitting her head. No history of seizures in the past. Patient with history of ETOH abuse, drinking every night, with her last drink was last night. On arrival to ED she was AAO X3, but with marked tremors, received Diazepam 10 mg IVP X1 dose, Ativan 1 mg IVP X2 doses, and Chlordiazepoxide 50 mg PO X1 dose, and was admitted to [facility] for ETOH detox.      (01 Nov 2021 23:49)  Currently is being monitored in [facility].  AAOX3, Shaky/Tremulous.  No further seizure reported    Interval History -    Patient is seen, chart was reviewed and case was discussed with the treatment team.  Sitting on chair comfortably.   No further seizure is reported.    ICU Vital Signs Last 24 Hrs  T(C): 36.6 (04 Nov 2021 12:30), Max: 36.7 (03 Nov 2021 17:07)  T(F): 97.8 (04 Nov 2021 12:30), Max: 98 (03 Nov 2021 17:07)  HR: 73 (04 Nov 2021 12:00) (60 - 85)  BP: 92/61 (04 Nov 2021 12:00) (92/61 - 116/75)  BP(mean): 71 (04 Nov 2021 12:00) (71 - 94)  ABP: --  ABP(mean): --  RR: 16 (04 Nov 2021 12:00) (10 - 22)  SpO2: 99% (04 Nov 2021 12:00) (95% - 100%)    MEDICATIONS  (STANDING):    diazepam    Tablet 2 milliGRAM(s) Oral every 6 hours  enoxaparin Injectable 40 milliGRAM(s) SubCutaneous daily  folic acid 1 milliGRAM(s) Oral daily  metoprolol tartrate 12.5 milliGRAM(s) Oral two times a day  multivitamin 1 Tablet(s) Oral daily  pantoprazole  Injectable 40 milliGRAM(s) IV Push daily  sertraline 50 milliGRAM(s) Oral daily    MEDICATIONS  (PRN):    diazepam    Tablet 5 milliGRAM(s) Oral every 4 hours PRN for ciwa > 5  LORazepam   Injectable 2 milliGRAM(s) IV Push every 15 minutes PRN for ciwa > 10 or Seizure activity    Allergies    No Known Allergies    REVIEW OF SYSTEMS:    CONSTITUTIONAL: No fever  EYES: No eye pain,   ENMT:  No sinus or throat pain  NECK: No pain or stiffness  RESPIRATORY: No cough, No hemoptysis; No shortness of breath  CARDIOVASCULAR: No acute chest pain, palpitations,  or leg swelling  GASTROINTESTINAL: No abdominal pain. No nausea, vomiting, or hematemesis;  No melena or hematochezia.  GENITOURINARY: No  hematuria, or incontinence  MUSCULOSKELETAL: No joint swelling; No extremity pain  SKIN: No itching, rashes, or lesions   LYMPH NODES: No enlarged glands  NEUROLOGICAL: No headaches, memory loss, Currently shaky/Tremulous. No h/o prior seizures  PSYCHIATRIC: H/o depression/anxiety  ENDOCRINE: No heat or cold intolerance;   HEME/LYMPH: No easy bruising, or bleeding gums  Allergy/Immunology. No medication allergy. No seasonal allergies.    PHYSICAL EXAM:    GENERAL: NAD, well-groomed, well-developed  HEAD:  Atraumatic, Normocephalic  EYES: EOMI, PERRLA, conjunctiva and sclera clear  NECK: Supple, No JVD, thyroid non-palpable    On Neurological Examination:    Mental Status - Pt is alert, awake, oriented X3. Higher functions are intact.     Speech -  Normal. Pt has no aphasia.    Cranial Nerves - Pupils 3 mm equal and reactive to light. No facial asymmetry. Tongue - is in midline.    Motor Exam - 4 plus/5 all over, No drift. Shaky/tremulous      Sensory Exam - Pt withdraws all extremities equally on stimulation.     Gait - Pt is able to stand up with holding my hands.     Deep tendon Reflexes - 2 plus all over.    Coordination - Fine finger movements and  Finger to nose normal except that pt is shaky.       Romberg - Negative.    Neck Supple -  Yes.    LABS:    CBC Full  -  ( 03 Nov 2021 06:18 )  WBC Count : 4.85 K/uL  RBC Count : 2.81 M/uL  Hemoglobin : 10.4 g/dL  Hematocrit : 31.6 %  Platelet Count - Automated : 61 K/uL  Mean Cell Volume : 112.5 fl  Mean Cell Hemoglobin : 37.0 pg  Mean Cell Hemoglobin Concentration : 32.9 gm/dL  Auto Neutrophil # : 2.79 K/uL  Auto Lymphocyte # : 1.13 K/uL  Auto Monocyte # : 0.73 K/uL  Auto Eosinophil # : 0.14 K/uL  Auto Basophil # : 0.04 K/uL  Auto Neutrophil % : 57.5 %  Auto Lymphocyte % : 23.3 %  Auto Monocyte % : 15.1 %  Auto Eosinophil % : 2.9 %  Auto Basophil % : 0.8 %    11-03 11-04    137  |  102  |  9   ----------------------------<  91  4.0   |  26  |  0.37<L>    Ca    8.7      04 Nov 2021 07:01  Phos  4.7     11-03  Mg     1.5     11-03    TPro  6.9  /  Alb  3.1<L>  /  TBili  0.4  /  DBili  x   /  AST  91<H>  /  ALT  59  /  AlkPhos  92  11-04      Ca    8.3<L>      03 Nov 2021 06:18  Phos  4.7     11-03  Mg     1.5     11-03    TPro  6.7  /  Alb  3.0<L>  /  TBili  0.6  /  DBili  x   /  AST  96<H>  /  ALT  56  /  AlkPhos  96  11-03    Vitamin B12, Serum: 1752 pg/mL (11-02 @ 21:36)    RADIOLOGY & ADDITIONAL STUDIES:    < from: CT Head No Cont (11.01.21 @ 20:26) >    No acute intracranial findings.    < end of copied text >    < from: US Abdomen Complete (US Abdomen Complete .) (11.02.21 @ 09:10) >    Fatty liver. Otherwise unremarkable study.    < end of copied text >

## 2021-11-04 NOTE — PROGRESS NOTE ADULT - ASSESSMENT
46y Female PMHx of HTN, Depression, Anxiety, Alcohol Use / Abuse (3-5 glasses of wine/day x 3 years) Admitted 11/1/2021 s/p witnessed seizure.     Alcohol WD Seizure  Alcohol WD       No Sz activity since admission.    On Standing Benzo's and CIWA triggered   CIWA has been < 5 X 24 hrs   Transaminitis improving   GI / PVT PPx

## 2021-11-04 NOTE — PROGRESS NOTE ADULT - SUBJECTIVE AND OBJECTIVE BOX
Critical Care PA - LOVE     46y Female PMHx of HTN, Depression, Anxiety, Alcohol Use / Abuse (3-5 glasses of wine/day x 3 years) Admitted 11/1/2021 s/p witnessed seizure.  RX With Alcohol WD Seizure / Alcohol WD.  No further Sz activity since admission.    --- PMHx.---    HTN (hypertension)    HTN (hypertension)    Anxiety           Review Of Systems: All reviewed systems were negative.    Previous Medical Record reviewed and case has been discussed with EICU.    --- Medications ---    diazepam    Tablet 5 milliGRAM(s)  diazepam    Tablet 5 milliGRAM(s) PRN  enoxaparin Injectable 40 milliGRAM(s)  folic acid 1 milliGRAM(s)  LORazepam   Injectable 2 milliGRAM(s) PRN  metoprolol tartrate 12.5 milliGRAM(s)  multivitamin 1 Tablet(s)  pantoprazole  Injectable 40 milliGRAM(s)  sertraline 50 milliGRAM(s)  thiamine IVPB 500 milliGRAM(s)      DVT Prophylaxis : Lovenox    Advanced Directives : Full Code     T(C): 35.6 (11-04-21 @ 02:00), Max: 36.7 (11-03-21 @ 12:50)  HR: 60 (11-04-21 @ 02:00)  BP: 110/80 (11-04-21 @ 02:00)  RR: 10 (11-04-21 @ 02:00)  SpO2: 100% (11-04-21 @ 02:00)    --- Labratories ---                           10.4   4.85  )-----------( 61       ( 03 Nov 2021 06:18 )             31.6    11-03    137  |  103  |  7   ----------------------------<  97  3.4<L>   |  24  |  0.41<L>    Ca    8.3<L>      03 Nov 2021 06:18  Phos  4.7     11-03  Mg     1.5     11-03    TPro  6.7  /  Alb  3.0<L>  /  TBili  0.6  /  DBili  x   /  AST  96<H>  /  ALT  56  /  AlkPhos  96  11-03          Radiology  / Ultrasound : ****    (Reviewing data, imaging, discussing with multidisciplinary team, non inclusive of procedures, discussing goals of care with patient/family)

## 2021-11-04 NOTE — PROGRESS NOTE ADULT - ASSESSMENT
47 y/o F with PMH of HTN, ETOH abuse, and Anxiety presented with a seizure episode.    will taper Valium this am -   on RA  vs noted  am labs reviewed    drinker - non smoker - anxious - depressed - grieving (lost her father)    valium ATC with hold parameters  valium PRN as per CIWA  ativan PRN as per CIWA and for Seizure activity  education - counseling - emotional support  may need Psych eval on this admission  planned for US abd - LFTs noted - surveillance  [facility] monitoring in progress  hydration  AA referral

## 2021-11-04 NOTE — PROGRESS NOTE ADULT - SUBJECTIVE AND OBJECTIVE BOX
Date/Time Patient Seen:  		  Referring MD:   Data Reviewed	       Patient is a 46y old  Female who presents with a chief complaint of Seizures. (2021 05:44)      Subjective/HPI     PAST MEDICAL & SURGICAL HISTORY:  HTN (hypertension)    HTN (hypertension)    Anxiety    H/O  section          Medication list         MEDICATIONS  (STANDING):  diazepam    Tablet 5 milliGRAM(s) Oral every 4 hours  enoxaparin Injectable 40 milliGRAM(s) SubCutaneous daily  folic acid 1 milliGRAM(s) Oral daily  metoprolol tartrate 12.5 milliGRAM(s) Oral two times a day  multivitamin 1 Tablet(s) Oral daily  pantoprazole  Injectable 40 milliGRAM(s) IV Push daily  sertraline 50 milliGRAM(s) Oral daily  thiamine IVPB 500 milliGRAM(s) IV Intermittent daily    MEDICATIONS  (PRN):  diazepam    Tablet 5 milliGRAM(s) Oral every 4 hours PRN for ciwa > 5  LORazepam   Injectable 2 milliGRAM(s) IV Push every 15 minutes PRN for ciwa > 10 or Seizure activity         Vitals log        ICU Vital Signs Last 24 Hrs  T(C): 36.1 (2021 06:00), Max: 36.7 (2021 12:50)  T(F): 97 (2021 06:00), Max: 98 (2021 12:50)  HR: 72 (2021 08:00) (60 - 92)  BP: 109/71 (2021 08:00) (103/69 - 116/75)  BP(mean): 82 (2021 08:00) (80 - 94)  ABP: --  ABP(mean): --  RR: 14 (2021 08:00) (10 - 22)  SpO2: 95% (2021 08:00) (95% - 100%)           Input and Output:  I&O's Detail    2021 07:01  -  2021 07:00  --------------------------------------------------------  IN:    IV PiggyBack: 150 mL    Oral Fluid: 180 mL  Total IN: 330 mL    OUT:  Total OUT: 0 mL    Total NET: 330 mL          Lab Data                        10.4   4.85  )-----------( 61       ( 2021 06:18 )             31.6     11-04    137  |  102  |  9   ----------------------------<  91  4.0   |  26  |  0.37<L>    Ca    8.7      2021 07:01  Phos  4.7     11-03  Mg     1.5     11-03    TPro  6.9  /  Alb  3.1<L>  /  TBili  0.4  /  DBili  x   /  AST  91<H>  /  ALT  59  /  AlkPhos  92  11-04      CARDIAC MARKERS ( 2021 12:47 )  x     / x     / 136 U/L / x     / x            Review of Systems	      Objective     Physical Examination    heart s1s2  lung dec BS  abd soft  head nc  verbal      Pertinent Lab findings & Imaging      Yessenia:  NO   Adequate UO     I&O's Detail    2021 07:01  -  2021 07:00  --------------------------------------------------------  IN:    IV PiggyBack: 150 mL    Oral Fluid: 180 mL  Total IN: 330 mL    OUT:  Total OUT: 0 mL    Total NET: 330 mL               Discussed with:     Cultures:	        Radiology

## 2021-11-04 NOTE — PROGRESS NOTE ADULT - ASSESSMENT
This is a 47 y/o F with PMH of HTN, ETOH abuse, and Anxiety who presented with a seizure episode witnessed by her   Pt has No prior h/o seizures.  Pt has no tongue bite.  No urinary incontinence.  Non focal exam.  CT Head - No acute pathology.  In Alcohol withdrawal/DTs.   Also has electrolyte abn  -corrected.  Alcohol withdrawal seizure   On CIWA protocol  Ativan PRN/Valium  Thiamine/FA/MVI supplementation.  No indication for anti seizure meds.  D/w pt to get EEG as an out pt. Questions answered. F/up with neurologist Dr. Lara - [phone] upon discharge.   Could be downgraded to Tele.  Counseling is done for complete alcohol cessation on daily basis. Pt understands and agrees.  Fall/seizure precautions at all times.  D/w covering nurse.  Would continue to follow.

## 2021-11-04 NOTE — PROGRESS NOTE ADULT - ASSESSMENT
46F with HTN, Anxiety, Depression and Alcohol Abuse admitted for Seizure episode     Seizure Episode - Most likely related to EtOH Withdrawal as patient didn't drink for 2 days prior due to work and then drank less than usual 24 hours prior to event. Counseled on EtOH cessation.  Neurology consult noted.    EtOH Abuse / Withdrawal - CIWA with Ativan/Valium. Valium being tapered.  MVI + Thiamine + Folic Acid.  EtOH cessation and behavioral health counseling advised.     Anxiety / Depression - Holding Lexapro.  Psych consulted and following     HTN - Controlled. Continue Metoprolol     Diet - Regular    DVT Prophylaxis - Lovenox     Disposition - Patient can be transferred to regular medical floor. Spoke to patient and .  Has been combatting problem with Alcohol for some time and has supportive family and  who want to help her quit. Patient will need referral for EtOH cessation programs.   Have advised her to find a therapist which she is doing. Patient can be monitored for additional 24 hours and be discharged tomorrow.

## 2021-11-05 ENCOUNTER — TRANSCRIPTION ENCOUNTER (OUTPATIENT)
Age: 46
End: 2021-11-05

## 2021-11-05 VITALS
HEART RATE: 67 BPM | DIASTOLIC BLOOD PRESSURE: 71 MMHG | OXYGEN SATURATION: 99 % | RESPIRATION RATE: 16 BRPM | SYSTOLIC BLOOD PRESSURE: 119 MMHG

## 2021-11-05 LAB
ANION GAP SERPL CALC-SCNC: 7 MMOL/L — SIGNIFICANT CHANGE UP (ref 5–17)
BUN SERPL-MCNC: 9 MG/DL — SIGNIFICANT CHANGE UP (ref 7–23)
CALCIUM SERPL-MCNC: 9 MG/DL — SIGNIFICANT CHANGE UP (ref 8.4–10.5)
CHLORIDE SERPL-SCNC: 102 MMOL/L — SIGNIFICANT CHANGE UP (ref 96–108)
CO2 SERPL-SCNC: 29 MMOL/L — SIGNIFICANT CHANGE UP (ref 22–31)
CREAT SERPL-MCNC: 0.4 MG/DL — LOW (ref 0.5–1.3)
GLUCOSE SERPL-MCNC: 104 MG/DL — HIGH (ref 70–99)
HCT VFR BLD CALC: 33 % — LOW (ref 34.5–45)
HGB BLD-MCNC: 11 G/DL — LOW (ref 11.5–15.5)
MCHC RBC-ENTMCNC: 33.3 GM/DL — SIGNIFICANT CHANGE UP (ref 32–36)
MCHC RBC-ENTMCNC: 36.8 PG — HIGH (ref 27–34)
MCV RBC AUTO: 110.4 FL — HIGH (ref 80–100)
NRBC # BLD: 0 /100 WBCS — SIGNIFICANT CHANGE UP (ref 0–0)
PLATELET # BLD AUTO: 121 K/UL — LOW (ref 150–400)
POTASSIUM SERPL-MCNC: 4.3 MMOL/L — SIGNIFICANT CHANGE UP (ref 3.5–5.3)
POTASSIUM SERPL-SCNC: 4.3 MMOL/L — SIGNIFICANT CHANGE UP (ref 3.5–5.3)
RBC # BLD: 2.99 M/UL — LOW (ref 3.8–5.2)
RBC # FLD: 13.2 % — SIGNIFICANT CHANGE UP (ref 10.3–14.5)
SODIUM SERPL-SCNC: 138 MMOL/L — SIGNIFICANT CHANGE UP (ref 135–145)
WBC # BLD: 5.11 K/UL — SIGNIFICANT CHANGE UP (ref 3.8–10.5)
WBC # FLD AUTO: 5.11 K/UL — SIGNIFICANT CHANGE UP (ref 3.8–10.5)

## 2021-11-05 PROCEDURE — 83690 ASSAY OF LIPASE: CPT

## 2021-11-05 PROCEDURE — 96376 TX/PRO/DX INJ SAME DRUG ADON: CPT

## 2021-11-05 PROCEDURE — 84100 ASSAY OF PHOSPHORUS: CPT

## 2021-11-05 PROCEDURE — 85025 COMPLETE CBC W/AUTO DIFF WBC: CPT

## 2021-11-05 PROCEDURE — 99239 HOSP IP/OBS DSCHRG MGMT >30: CPT

## 2021-11-05 PROCEDURE — 82550 ASSAY OF CK (CPK): CPT

## 2021-11-05 PROCEDURE — 86769 SARS-COV-2 COVID-19 ANTIBODY: CPT

## 2021-11-05 PROCEDURE — 96361 HYDRATE IV INFUSION ADD-ON: CPT

## 2021-11-05 PROCEDURE — 83735 ASSAY OF MAGNESIUM: CPT

## 2021-11-05 PROCEDURE — 85730 THROMBOPLASTIN TIME PARTIAL: CPT

## 2021-11-05 PROCEDURE — 93005 ELECTROCARDIOGRAM TRACING: CPT

## 2021-11-05 PROCEDURE — 82140 ASSAY OF AMMONIA: CPT

## 2021-11-05 PROCEDURE — 80307 DRUG TEST PRSMV CHEM ANLYZR: CPT

## 2021-11-05 PROCEDURE — 99285 EMERGENCY DEPT VISIT HI MDM: CPT

## 2021-11-05 PROCEDURE — 96374 THER/PROPH/DIAG INJ IV PUSH: CPT

## 2021-11-05 PROCEDURE — 76700 US EXAM ABDOM COMPLETE: CPT

## 2021-11-05 PROCEDURE — 80053 COMPREHEN METABOLIC PANEL: CPT

## 2021-11-05 PROCEDURE — 83605 ASSAY OF LACTIC ACID: CPT

## 2021-11-05 PROCEDURE — 82607 VITAMIN B-12: CPT

## 2021-11-05 PROCEDURE — 70450 CT HEAD/BRAIN W/O DYE: CPT | Mod: MA

## 2021-11-05 PROCEDURE — 80048 BASIC METABOLIC PNL TOTAL CA: CPT

## 2021-11-05 PROCEDURE — 85610 PROTHROMBIN TIME: CPT

## 2021-11-05 PROCEDURE — 84702 CHORIONIC GONADOTROPIN TEST: CPT

## 2021-11-05 PROCEDURE — 36415 COLL VENOUS BLD VENIPUNCTURE: CPT

## 2021-11-05 PROCEDURE — 87493 C DIFF AMPLIFIED PROBE: CPT

## 2021-11-05 PROCEDURE — 85027 COMPLETE CBC AUTOMATED: CPT

## 2021-11-05 PROCEDURE — 84443 ASSAY THYROID STIM HORMONE: CPT

## 2021-11-05 PROCEDURE — 87635 SARS-COV-2 COVID-19 AMP PRB: CPT

## 2021-11-05 RX ADMIN — Medication 2 MILLIGRAM(S): at 06:27

## 2021-11-05 RX ADMIN — Medication 12.5 MILLIGRAM(S): at 06:27

## 2021-11-05 RX ADMIN — PANTOPRAZOLE SODIUM 40 MILLIGRAM(S): 20 TABLET, DELAYED RELEASE ORAL at 11:13

## 2021-11-05 RX ADMIN — Medication 1 MILLIGRAM(S): at 11:12

## 2021-11-05 RX ADMIN — Medication 1 TABLET(S): at 11:12

## 2021-11-05 RX ADMIN — SERTRALINE 50 MILLIGRAM(S): 25 TABLET, FILM COATED ORAL at 11:12

## 2021-11-05 NOTE — DISCHARGE NOTE PROVIDER - NSDCMRMEDTOKEN_GEN_ALL_CORE_FT
metoprolol tartrate 25 mg oral tablet: 25 milligram(s) orally once a day (at bedtime)  sertraline 50 mg oral tablet: 1 tab(s) orally once a day

## 2021-11-05 NOTE — PROGRESS NOTE ADULT - REASON FOR ADMISSION
Seizures
Seizures.

## 2021-11-05 NOTE — DISCHARGE NOTE PROVIDER - NSDCCPCAREPLAN_GEN_ALL_CORE_FT
PRINCIPAL DISCHARGE DIAGNOSIS  Diagnosis: Alcohol withdrawal seizure  Assessment and Plan of Treatment: Alcohol Cessation counseled.  Program resources given.

## 2021-11-05 NOTE — PROGRESS NOTE ADULT - SUBJECTIVE AND OBJECTIVE BOX
RELL BERNAL  MRN-[id number]  Patient is a 46y old  Female who presents with a chief complaint of Seizures. (2021 14:58)    HPI:  46y Female PMHx of HTN, Depression, Anxiety, Alcohol Use / Abuse (3-5 glasses of wine/day x 3 years) Admitted 2021 s/p witnessed seizure.  RX With Alcohol WD Seizure / Alcohol WD.  No further Sz activity since admission.      Events in last 24 hours:   ALEYDA ON, given tylenol for HA and pain control     REVIEW OF SYSTEMS:    Gen: No fever  EYES/ENT: No visual changes;  No vertigo or throat pain   NECK: No pain   RES:  No shortness of breath or Cough  CARD: No chest pain   GI: No abdominal pain  : No dysuria  NEURO: No weakness  SKIN: No itching, rashes     Physical Exam:  Vital Signs Last 24 Hrs  T(C): 36.2 (2021 00:00), Max: 36.6 (2021 12:30)  T(F): 97.1 (2021 00:00), Max: 97.8 (2021 12:30)  HR: 69 (2021 04:00) (66 - 99)  BP: 121/83 (2021 04:00) (92/61 - 121/83)  BP(mean): 93 (2021 04:00) (71 - 93)  RR: 12 (2021 04:00) (11 - 25)  SpO2: 96% (2021 04:00) (95% - 100%)    Gen:  Awake, alert   CNS: non focal 	  Neck: no JVD  RES : clear , no wheezing                      CVS: Regular  rhythm. Normal S1/S2  Abd: Soft, non-distended. Bowel sounds present.  Skin: No rash.  Ext:  no edema    ============================I/O===========================   I&O's Detail    2021 07:01  -  2021 07:00  --------------------------------------------------------  IN:    IV PiggyBack: 150 mL    Oral Fluid: 180 mL  Total IN: 330 mL    OUT:  Total OUT: 0 mL    Total NET: 330 mL      2021 07:01  -  2021 06:02  --------------------------------------------------------  IN:    Oral Fluid: 790 mL  Total IN: 790 mL    OUT:  Total OUT: 0 mL    Total NET: 790 mL        ============================ LABS =========================                        10.4   4.85  )-----------( 61       ( 2021 06:18 )             31.6     11-04    137  |  102  |  9   ----------------------------<  91  4.0   |  26  |  0.37<L>    Ca    8.7      2021 07:01  Phos  4.7     11-03  Mg     1.5     11-03    TPro  6.9  /  Alb  3.1<L>  /  TBili  0.4  /  DBili  x   /  AST  91<H>  /  ALT  59  /  AlkPhos  92  11-04    LIVER FUNCTIONS - ( 2021 07:01 )  Alb: 3.1 g/dL / Pro: 6.9 g/dL / ALK PHOS: 92 U/L / ALT: 59 U/L DA / AST: 91 U/L / GGT: x               ======================================================  PAST MEDICAL & SURGICAL HISTORY:  HTN (hypertension)    HTN (hypertension)    Anxiety    H/O  section        ====================ASSESSMENT ==============  ETOH withdrawal and DT's with seizures     Plan     -No Sz overnight or since admitted  activity since admission.    -CIWA protocol   -On Standing Valium and PRN Valium and ativan for elevated CIWA   -Transaminitis improving   -lopressor 12.5 BID for BP control   DVT ppx with lovenox   -Tele border in [facility] remains stable to go to tele Overnight     time spent 30 min

## 2021-11-05 NOTE — PROGRESS NOTE ADULT - SUBJECTIVE AND OBJECTIVE BOX
Date/Time Patient Seen:  		  Referring MD:   Data Reviewed	       Patient is a 46y old  Female who presents with a chief complaint of Seizures. (2021 06:02)      Subjective/HPI     PAST MEDICAL & SURGICAL HISTORY:  HTN (hypertension)    HTN (hypertension)    Anxiety    H/O  section          Medication list         MEDICATIONS  (STANDING):  diazepam    Tablet 2 milliGRAM(s) Oral every 6 hours  enoxaparin Injectable 40 milliGRAM(s) SubCutaneous daily  folic acid 1 milliGRAM(s) Oral daily  metoprolol tartrate 12.5 milliGRAM(s) Oral two times a day  multivitamin 1 Tablet(s) Oral daily  pantoprazole  Injectable 40 milliGRAM(s) IV Push daily  sertraline 50 milliGRAM(s) Oral daily    MEDICATIONS  (PRN):  acetaminophen     Tablet .. 650 milliGRAM(s) Oral every 6 hours PRN Temp greater or equal to 38C (100.4F), Moderate Pain (4 - 6)  diazepam    Tablet 5 milliGRAM(s) Oral every 4 hours PRN for ciwa > 5  LORazepam   Injectable 2 milliGRAM(s) IV Push every 15 minutes PRN for ciwa > 10 or Seizure activity         Vitals log        ICU Vital Signs Last 24 Hrs  T(C): 36.2 (2021 00:00), Max: 36.6 (2021 12:30)  T(F): 97.1 (2021 00:00), Max: 97.8 (2021 12:30)  HR: 70 (2021 06:00) (66 - 99)  BP: 101/66 (2021 06:00) (92/61 - 121/83)  BP(mean): 76 (2021 06:00) (71 - 93)  ABP: --  ABP(mean): --  RR: 12 (2021 06:00) (11 - 25)  SpO2: 95% (2021 06:00) (95% - 100%)           Input and Output:  I&O's Detail    2021 07:01  -  2021 07:00  --------------------------------------------------------  IN:    Oral Fluid: 850 mL  Total IN: 850 mL    OUT:  Total OUT: 0 mL    Total NET: 850 mL          Lab Data                        11.0   5.11  )-----------( 121      ( 2021 06:45 )             33.0         138  |  102  |  9   ----------------------------<  104<H>  4.3   |  29  |  0.40<L>    Ca    9.0      2021 06:45    TPro  6.9  /  Alb  3.1<L>  /  TBili  0.4  /  DBili  x   /  AST  91<H>  /  ALT  59  /  AlkPhos  92  04            Review of Systems	      Objective     Physical Examination    heart s1s2  lung dec BS  abd soft  head nc  verbal  alert  cn grossly int      Pertinent Lab findings & Imaging      Yessenia:  NO   Adequate UO     I&O's Detail    2021 07:01  -  2021 07:00  --------------------------------------------------------  IN:    Oral Fluid: 850 mL  Total IN: 850 mL    OUT:  Total OUT: 0 mL    Total NET: 850 mL               Discussed with:     Cultures:	        Radiology

## 2021-11-05 NOTE — PROGRESS NOTE ADULT - ASSESSMENT
47 y/o F with PMH of HTN, ETOH abuse, and Anxiety presented with a seizure episode.    will dc valium this am   cm notes reviewed  vs noted  labs reviewed    drinker - non smoker - anxious - depressed - grieving (lost her father)    s/p valium ATC with hold parameters  valium PRN as per CIWA  ativan PRN as per CIWA and for Seizure activity  education - counseling - emotional support  may need Psych eval on this admission  planned for US abd - LFTs noted - surveillance  [facility] monitoring in progress  hydration  AA referral 47 y/o F with PMH of HTN, ETOH abuse, and Anxiety presented with a seizure episode.    will dc valium this am   cm notes reviewed  vs noted  labs reviewed    drinker - non smoker - anxious - depressed - grieving (lost her father)    s/p valium ATC with hold parameters  valium PRN as per CIWA  ativan PRN as per CIWA and for Seizure activity  education - counseling - emotional support  psych note reviewed  planned for US abd - LFTs noted - surveillance  hydration  AA referral

## 2021-11-05 NOTE — PROGRESS NOTE ADULT - PROVIDER SPECIALTY LIST ADULT
Critical Care
Critical Care
Addiction Medicine
Critical Care
Addiction Medicine
Addiction Medicine
Hospitalist
Hospitalist
Neurology
Neurology
Hospitalist

## 2021-11-05 NOTE — DISCHARGE NOTE NURSING/CASE MANAGEMENT/SOCIAL WORK - PATIENT PORTAL LINK FT
You can access the FollowMyHealth Patient Portal offered by [facility] by registering at the following website: [email or web address]. By joining [email or web address]’s FollowMyHealth portal, you will also be able to view your health information using other applications (apps) compatible with our system.

## 2021-11-05 NOTE — DISCHARGE NOTE PROVIDER - NSDCFUADDINST_GEN_ALL_CORE_FT
Please follow through with the Alcohol Program resources given. Please seek therapy as advised. Seizure was related to Alcohol use.

## 2021-11-05 NOTE — DISCHARGE NOTE PROVIDER - HOSPITAL COURSE
46F with HTN, Anxiety, Depression and Alcohol Abuse admitted for Seizure episode     Seizure Episode - Related to EtOH Withdrawal as patient didn't drink for 2 days prior due to work and then drank less than usual 24 hours prior to event. Counseled on EtOH cessation.  Neurology consult noted.    EtOH Abuse / Withdrawal - CIWA with Ativan/Valium which has been tapered off. MVI + Thiamine + Folic Acid.  EtOH cessation and behavioral health counseling advised. Patient given resources for EtOH programs.     Anxiety / Depression - Holding Lexapro.  Psych consulted and following     HTN - Controlled. Continue Metoprolol     DVT Prophylaxis - Lovenox     Disposition - Patient can be transferred to regular medical floor. Spoke to patient and .  Has been combatting problem with Alcohol for some time and has supportive family and  who want to help her quit. Have advised her to find a therapist which she is doing. Patient discharged home.

## 2021-11-22 PROBLEM — I10 ESSENTIAL (PRIMARY) HYPERTENSION: Chronic | Status: ACTIVE | Noted: 2021-11-01

## 2021-11-22 PROBLEM — F41.9 ANXIETY DISORDER, UNSPECIFIED: Chronic | Status: ACTIVE | Noted: 2021-11-01

## 2022-02-01 ENCOUNTER — APPOINTMENT (OUTPATIENT)
Dept: GASTROENTEROLOGY | Facility: CLINIC | Age: 47
End: 2022-02-01

## 2022-02-01 PROBLEM — Z00.00 ENCOUNTER FOR PREVENTIVE HEALTH EXAMINATION: Status: ACTIVE | Noted: 2022-02-01

## 2022-05-03 ENCOUNTER — APPOINTMENT (OUTPATIENT)
Dept: GASTROENTEROLOGY | Facility: CLINIC | Age: 47
End: 2022-05-03

## 2022-11-05 ENCOUNTER — NON-APPOINTMENT (OUTPATIENT)
Age: 47
End: 2022-11-05

## 2023-03-02 NOTE — BH CONSULTATION LIAISON ASSESSMENT NOTE - RISK ASSESSMENT
[de-identified] : Patient has a long history of exposure to loud music and would like his hearing to be checked. He denies pain in the ears or ringing in the ears. \par He has a long family history of family members feeling like they're choking on food, but never a definitive diagnosis given. He too feels that food and or liquids get stuck in the throat about 2-3 times a month. The most recent episode was last night. It takes about 15 minutes for the food or drink to go down completely or to feel resolution\par He does snore at night and his wife states that it has worsened. He does blow his nose several times a day. He does admit that the clogging in the right nasal cavity is worse than the left. He uses Flonase on occasion  Patient remains future-oriented, has good social support, no prior suicidal ideaiton

## 2024-10-16 ENCOUNTER — OFFICE (OUTPATIENT)
Dept: URBAN - METROPOLITAN AREA CLINIC 12 | Facility: CLINIC | Age: 49
Setting detail: OPHTHALMOLOGY
End: 2024-10-16
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
## 2024-10-16 PROCEDURE — NO SHOW FE NO SHOW FEE: Performed by: OPHTHALMOLOGY

## 2024-12-30 ENCOUNTER — OFFICE (OUTPATIENT)
Dept: URBAN - METROPOLITAN AREA CLINIC 12 | Facility: CLINIC | Age: 49
Setting detail: OPHTHALMOLOGY
End: 2024-12-30
Payer: COMMERCIAL

## 2024-12-30 DIAGNOSIS — H16.223: ICD-10-CM

## 2024-12-30 DIAGNOSIS — H02.834: ICD-10-CM

## 2024-12-30 DIAGNOSIS — H40.013: ICD-10-CM

## 2024-12-30 DIAGNOSIS — H02.831: ICD-10-CM

## 2024-12-30 PROBLEM — H52.7 REFRACTIVE ERROR: Status: ACTIVE | Noted: 2024-12-30

## 2024-12-30 PROCEDURE — 92133 CPTRZD OPH DX IMG PST SGM ON: CPT | Performed by: STUDENT IN AN ORGANIZED HEALTH CARE EDUCATION/TRAINING PROGRAM

## 2024-12-30 PROCEDURE — 92014 COMPRE OPH EXAM EST PT 1/>: CPT | Performed by: STUDENT IN AN ORGANIZED HEALTH CARE EDUCATION/TRAINING PROGRAM

## 2024-12-30 ASSESSMENT — REFRACTION_AUTOREFRACTION
OS_AXIS: 000
OD_AXIS: 104
OS_CYLINDER: SPHERE
OD_CYLINDER: -0.50
OS_SPHERE: +0.25
OD_SPHERE: +0.50

## 2024-12-30 ASSESSMENT — VISUAL ACUITY
OD_BCVA: 20/40
OS_BCVA: 20/40

## 2024-12-30 ASSESSMENT — REFRACTION_MANIFEST
OD_AXIS: 090
OD_VA1: 20/20
OS_VA1: 20/20
OD_CYLINDER: -0.50
OD_SPHERE: +0.25
OS_AXIS: 090
OS_SPHERE: +0.50
OS_CYLINDER: -0.50

## 2024-12-30 ASSESSMENT — LID POSITION - DERMATOCHALASIS
OD_DERMATOCHALASIS: RUL 1+ 2+
OS_DERMATOCHALASIS: LUL 1+ 2+

## 2024-12-30 ASSESSMENT — SUPERFICIAL PUNCTATE KERATITIS (SPK)
OD_SPK: 2+
OS_SPK: 2+

## 2024-12-30 ASSESSMENT — KERATOMETRY
OD_K1POWER_DIOPTERS: 44.00
OS_AXISANGLE_DEGREES: 009
OS_K2POWER_DIOPTERS: 44.00
OS_K1POWER_DIOPTERS: 43.50
OD_K2POWER_DIOPTERS: 44.50
OD_AXISANGLE_DEGREES: 065

## 2024-12-30 ASSESSMENT — TONOMETRY
OS_IOP_MMHG: 15
OD_IOP_MMHG: 14

## 2024-12-30 ASSESSMENT — CONFRONTATIONAL VISUAL FIELD TEST (CVF)
OS_FINDINGS: FULL
OD_FINDINGS: FULL

## 2024-12-30 ASSESSMENT — DRY EYES - PHYSICIAN NOTES
OS_GENERALCOMMENTS: MODERATE
OD_GENERALCOMMENTS: MODERATE

## 2025-01-20 ENCOUNTER — OFFICE (OUTPATIENT)
Facility: LOCATION | Age: 50
Setting detail: OPHTHALMOLOGY
End: 2025-01-20
Payer: COMMERCIAL

## 2025-01-20 DIAGNOSIS — H02.831: ICD-10-CM

## 2025-01-20 DIAGNOSIS — H02.834: ICD-10-CM

## 2025-01-20 PROCEDURE — 92285 EXTERNAL OCULAR PHOTOGRAPHY: CPT | Performed by: OPHTHALMOLOGY

## 2025-01-20 PROCEDURE — 99214 OFFICE O/P EST MOD 30 MIN: CPT | Performed by: OPHTHALMOLOGY

## 2025-01-20 ASSESSMENT — LID POSITION - DERMATOCHALASIS
OD_DERMATOCHALASIS: RUL 2+
OS_DERMATOCHALASIS: LUL 2+

## 2025-01-20 ASSESSMENT — CONFRONTATIONAL VISUAL FIELD TEST (CVF)
OD_FINDINGS: FULL
OS_FINDINGS: FULL

## 2025-01-20 ASSESSMENT — KERATOMETRY
OD_K1POWER_DIOPTERS: 44.00
OD_K2POWER_DIOPTERS: 44.50
OD_AXISANGLE_DEGREES: 065
OS_K2POWER_DIOPTERS: 44.00
OS_AXISANGLE_DEGREES: 009
OS_K1POWER_DIOPTERS: 43.50

## 2025-01-20 ASSESSMENT — REFRACTION_AUTOREFRACTION
OS_CYLINDER: SPHERE
OD_SPHERE: +0.50
OS_SPHERE: +0.25
OD_AXIS: 104
OD_CYLINDER: -0.50
OS_AXIS: 000

## 2025-01-20 ASSESSMENT — DRY EYES - PHYSICIAN NOTES
OD_GENERALCOMMENTS: MODERATE
OS_GENERALCOMMENTS: MODERATE

## 2025-01-20 ASSESSMENT — VISUAL ACUITY
OS_BCVA: 20/20-1
OD_BCVA: 20/20

## 2025-01-20 ASSESSMENT — SUPERFICIAL PUNCTATE KERATITIS (SPK)
OS_SPK: 2+
OD_SPK: 2+

## 2025-02-10 ENCOUNTER — OFFICE (OUTPATIENT)
Dept: URBAN - METROPOLITAN AREA CLINIC 100 | Facility: CLINIC | Age: 50
Setting detail: OPHTHALMOLOGY
End: 2025-02-10
Payer: COMMERCIAL

## 2025-02-10 DIAGNOSIS — H02.831: ICD-10-CM

## 2025-02-10 PROCEDURE — RX/CHECK RX/CHECK: Performed by: OPHTHALMOLOGY

## 2025-02-10 ASSESSMENT — CONFRONTATIONAL VISUAL FIELD TEST (CVF)
OS_FINDINGS: FULL
OD_FINDINGS: FULL

## 2025-02-10 ASSESSMENT — VISUAL ACUITY
OD_BCVA: 20/20
OS_BCVA: 20/20-1

## 2025-02-10 ASSESSMENT — KERATOMETRY: METHOD_AUTO_MANUAL: AUTO

## 2025-02-17 ENCOUNTER — OFFICE (OUTPATIENT)
Facility: LOCATION | Age: 50
Setting detail: OPHTHALMOLOGY
End: 2025-02-17
Payer: COMMERCIAL

## 2025-02-17 ENCOUNTER — RX ONLY (RX ONLY)
Age: 50
End: 2025-02-17

## 2025-02-17 DIAGNOSIS — H02.831: ICD-10-CM

## 2025-02-17 DIAGNOSIS — H02.834: ICD-10-CM

## 2025-02-17 PROCEDURE — 15823 BLEPHARP UPR EYELID XCSV SKN: CPT | Mod: 50 | Performed by: OPHTHALMOLOGY

## 2025-02-17 ASSESSMENT — VISUAL ACUITY
OD_BCVA: 20/20
OS_BCVA: 20/20-1

## 2025-02-17 ASSESSMENT — KERATOMETRY: METHOD_AUTO_MANUAL: AUTO

## 2025-02-23 ENCOUNTER — OFFICE (OUTPATIENT)
Dept: URBAN - METROPOLITAN AREA CLINIC 100 | Facility: CLINIC | Age: 50
Setting detail: OPHTHALMOLOGY
End: 2025-02-23
Payer: COMMERCIAL

## 2025-02-23 ENCOUNTER — RX ONLY (RX ONLY)
Age: 50
End: 2025-02-23

## 2025-02-23 DIAGNOSIS — H02.831: ICD-10-CM

## 2025-02-23 DIAGNOSIS — H02.834: ICD-10-CM

## 2025-02-23 PROCEDURE — 99024 POSTOP FOLLOW-UP VISIT: CPT | Performed by: OPHTHALMOLOGY

## 2025-02-23 ASSESSMENT — VISUAL ACUITY
OS_BCVA: 20/20
OD_BCVA: 20/20-3

## 2025-02-23 ASSESSMENT — LID POSITION - COMMENTS
OS_COMMENTS: WOUND C/D/I HEALING WELLÂ­GOOD HEIGHT AND GOOD SYMMETRYÂ­
OD_COMMENTS: WOUND C/D/I HEALING WELLÂ­GOOD HEIGHT AND GOOD SYMMETRYÂ­

## 2025-02-23 ASSESSMENT — DRY EYES - PHYSICIAN NOTES
OS_GENERALCOMMENTS: MODERATE
OD_GENERALCOMMENTS: MODERATE

## 2025-02-23 ASSESSMENT — SUPERFICIAL PUNCTATE KERATITIS (SPK)
OD_SPK: 2+
OS_SPK: 2+

## 2025-02-23 ASSESSMENT — CONFRONTATIONAL VISUAL FIELD TEST (CVF)
OS_FINDINGS: FULL
OD_FINDINGS: FULL

## 2025-02-23 ASSESSMENT — LID POSITION - DERMATOCHALASIS
OD_DERMATOCHALASIS: ABSENT
OS_DERMATOCHALASIS: ABSENT

## 2025-02-23 ASSESSMENT — KERATOMETRY: METHOD_AUTO_MANUAL: AUTO

## 2025-03-23 ENCOUNTER — EMERGENCY (EMERGENCY)
Facility: HOSPITAL | Age: 50
LOS: 0 days | Discharge: HOME | End: 2025-03-23
Attending: EMERGENCY MEDICINE
Payer: COMMERCIAL

## 2025-03-23 VITALS
DIASTOLIC BLOOD PRESSURE: 90 MMHG | TEMPERATURE: 98 F | HEART RATE: 106 BPM | RESPIRATION RATE: 18 BRPM | OXYGEN SATURATION: 97 % | SYSTOLIC BLOOD PRESSURE: 138 MMHG

## 2025-03-23 VITALS
RESPIRATION RATE: 22 BRPM | SYSTOLIC BLOOD PRESSURE: 152 MMHG | HEART RATE: 153 BPM | TEMPERATURE: 98 F | DIASTOLIC BLOOD PRESSURE: 88 MMHG | OXYGEN SATURATION: 98 %

## 2025-03-23 DIAGNOSIS — F10.929 ALCOHOL USE, UNSPECIFIED WITH INTOXICATION, UNSPECIFIED: ICD-10-CM
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
## 2025-03-23 DIAGNOSIS — I10 ESSENTIAL (PRIMARY) HYPERTENSION: ICD-10-CM

## 2025-03-23 DIAGNOSIS — F32.A DEPRESSION, UNSPECIFIED: ICD-10-CM

## 2025-03-23 DIAGNOSIS — Z98.891 HISTORY OF UTERINE SCAR FROM PREVIOUS SURGERY: Chronic | ICD-10-CM

## 2025-03-23 LAB
ALBUMIN SERPL ELPH-MCNC: 3.7 G/DL — SIGNIFICANT CHANGE UP (ref 3.3–5)
ALP SERPL-CCNC: 103 U/L — SIGNIFICANT CHANGE UP (ref 40–120)
ALT FLD-CCNC: 37 U/L — SIGNIFICANT CHANGE UP (ref 12–78)
AMPHET UR-MCNC: NEGATIVE — SIGNIFICANT CHANGE UP
ANION GAP SERPL CALC-SCNC: 5 MMOL/L — SIGNIFICANT CHANGE UP (ref 5–17)
APTT BLD: 29.9 SEC — SIGNIFICANT CHANGE UP (ref 24.5–35.6)
AST SERPL-CCNC: 49 U/L — HIGH (ref 15–37)
BARBITURATES UR SCN-MCNC: NEGATIVE — SIGNIFICANT CHANGE UP
BASOPHILS # BLD AUTO: 0.05 K/UL — SIGNIFICANT CHANGE UP (ref 0–0.2)
BASOPHILS NFR BLD AUTO: 0.7 % — SIGNIFICANT CHANGE UP (ref 0–2)
BENZODIAZ UR-MCNC: NEGATIVE — SIGNIFICANT CHANGE UP
BILIRUB SERPL-MCNC: 0.1 MG/DL — LOW (ref 0.2–1.2)
BUN SERPL-MCNC: 8 MG/DL — SIGNIFICANT CHANGE UP (ref 7–23)
CALCIUM SERPL-MCNC: 8.3 MG/DL — LOW (ref 8.5–10.1)
CHLORIDE SERPL-SCNC: 108 MMOL/L — SIGNIFICANT CHANGE UP (ref 96–108)
CO2 SERPL-SCNC: 27 MMOL/L — SIGNIFICANT CHANGE UP (ref 22–31)
COCAINE METAB.OTHER UR-MCNC: NEGATIVE — SIGNIFICANT CHANGE UP
CREAT SERPL-MCNC: 0.57 MG/DL — SIGNIFICANT CHANGE UP (ref 0.5–1.3)
EGFR: 112 ML/MIN/1.73M2 — SIGNIFICANT CHANGE UP
EGFR: 112 ML/MIN/1.73M2 — SIGNIFICANT CHANGE UP
EOSINOPHIL # BLD AUTO: 0.06 K/UL — SIGNIFICANT CHANGE UP (ref 0–0.5)
EOSINOPHIL NFR BLD AUTO: 0.9 % — SIGNIFICANT CHANGE UP (ref 0–6)
ETHANOL SERPL-MCNC: 423 MG/DL — HIGH (ref 0–10)
FENTANYL UR QL SCN: NEGATIVE — SIGNIFICANT CHANGE UP
GLUCOSE SERPL-MCNC: 122 MG/DL — HIGH (ref 70–99)
HCG SERPL-ACNC: <1 MIU/ML — SIGNIFICANT CHANGE UP
HCT VFR BLD CALC: 39.6 % — SIGNIFICANT CHANGE UP (ref 34.5–45)
HGB BLD-MCNC: 13.3 G/DL — SIGNIFICANT CHANGE UP (ref 11.5–15.5)
IMM GRANULOCYTES # BLD AUTO: 0.02 K/UL — SIGNIFICANT CHANGE UP (ref 0–0.07)
IMM GRANULOCYTES NFR BLD AUTO: 0.3 % — SIGNIFICANT CHANGE UP (ref 0–0.9)
INR BLD: 1.04 RATIO — SIGNIFICANT CHANGE UP (ref 0.85–1.16)
LYMPHOCYTES # BLD AUTO: 1.83 K/UL — SIGNIFICANT CHANGE UP (ref 1–3.3)
LYMPHOCYTES NFR BLD AUTO: 27 % — SIGNIFICANT CHANGE UP (ref 13–44)
MCHC RBC-ENTMCNC: 33.6 G/DL — SIGNIFICANT CHANGE UP (ref 32–36)
MCHC RBC-ENTMCNC: 35.2 PG — HIGH (ref 27–34)
MCV RBC AUTO: 104.8 FL — HIGH (ref 80–100)
METHADONE UR-MCNC: NEGATIVE — SIGNIFICANT CHANGE UP
MONOCYTES # BLD AUTO: 0.36 K/UL — SIGNIFICANT CHANGE UP (ref 0–0.9)
MONOCYTES NFR BLD AUTO: 5.3 % — SIGNIFICANT CHANGE UP (ref 2–14)
NEUTROPHILS # BLD AUTO: 4.46 K/UL — SIGNIFICANT CHANGE UP (ref 1.8–7.4)
NEUTROPHILS NFR BLD AUTO: 65.8 % — SIGNIFICANT CHANGE UP (ref 43–77)
NRBC # BLD AUTO: 0 K/UL — SIGNIFICANT CHANGE UP (ref 0–0)
NRBC # FLD: 0 K/UL — SIGNIFICANT CHANGE UP (ref 0–0)
NRBC BLD AUTO-RTO: 0 /100 WBCS — SIGNIFICANT CHANGE UP (ref 0–0)
OPIATES UR-MCNC: NEGATIVE — SIGNIFICANT CHANGE UP
PCP SPEC-MCNC: SIGNIFICANT CHANGE UP
PCP UR-MCNC: NEGATIVE — SIGNIFICANT CHANGE UP
PLATELET # BLD AUTO: 330 K/UL — SIGNIFICANT CHANGE UP (ref 150–400)
PMV BLD: 8.9 FL — SIGNIFICANT CHANGE UP (ref 7–13)
POTASSIUM SERPL-MCNC: 3.8 MMOL/L — SIGNIFICANT CHANGE UP (ref 3.5–5.3)
POTASSIUM SERPL-SCNC: 3.8 MMOL/L — SIGNIFICANT CHANGE UP (ref 3.5–5.3)
PROT SERPL-MCNC: 8.4 GM/DL — HIGH (ref 6–8.3)
PROTHROM AB SERPL-ACNC: 12 SEC — SIGNIFICANT CHANGE UP (ref 9.9–13.4)
RBC # BLD: 3.78 M/UL — LOW (ref 3.8–5.2)
RBC # FLD: 12.9 % — SIGNIFICANT CHANGE UP (ref 10.3–14.5)
SODIUM SERPL-SCNC: 140 MMOL/L — SIGNIFICANT CHANGE UP (ref 135–145)
THC UR QL: NEGATIVE — SIGNIFICANT CHANGE UP
WBC # BLD: 6.78 K/UL — SIGNIFICANT CHANGE UP (ref 3.8–10.5)
WBC # FLD AUTO: 6.78 K/UL — SIGNIFICANT CHANGE UP (ref 3.8–10.5)

## 2025-03-23 PROCEDURE — 84702 CHORIONIC GONADOTROPIN TEST: CPT

## 2025-03-23 PROCEDURE — 99284 EMERGENCY DEPT VISIT MOD MDM: CPT

## 2025-03-23 PROCEDURE — 80053 COMPREHEN METABOLIC PANEL: CPT

## 2025-03-23 PROCEDURE — 99285 EMERGENCY DEPT VISIT HI MDM: CPT | Mod: 25

## 2025-03-23 PROCEDURE — 85610 PROTHROMBIN TIME: CPT

## 2025-03-23 PROCEDURE — 80307 DRUG TEST PRSMV CHEM ANLYZR: CPT

## 2025-03-23 PROCEDURE — 36000 PLACE NEEDLE IN VEIN: CPT

## 2025-03-23 PROCEDURE — 85730 THROMBOPLASTIN TIME PARTIAL: CPT

## 2025-03-23 PROCEDURE — 36415 COLL VENOUS BLD VENIPUNCTURE: CPT

## 2025-03-23 PROCEDURE — 85025 COMPLETE CBC W/AUTO DIFF WBC: CPT

## 2025-03-23 RX ADMIN — Medication 1000 MILLILITER(S): at 16:44

## 2025-03-23 NOTE — ED STATDOCS - NSFOLLOWUPINSTRUCTIONS_ED_ALL_ED_FT
Alcohol Intoxication    WHAT YOU NEED TO KNOW:    What is alcohol intoxication? Alcohol intoxication is a harmful physical condition caused when you drink more alcohol than your body can handle. It is also called ethanol poisoning, or being drunk.    What do I need to know about recommended alcohol limits?    Men 21 to 64 years should limit alcohol to 2 drinks a day. Do not have more than 4 drinks in 1 day or more than 14 in 1 week.    All women, and men 65 or older should limit alcohol to 1 drink in a day. Do not have more than 3 drinks in 1 day or more than 7 in 1 week. No amount of alcohol is okay during pregnancy.  What are common signs and symptoms of alcohol intoxication?    Breath that smells like alcohol    Blackouts or seizures    Enlarged pupils, or eye movements that are faster than normal for you    Fast heartbeat and slow breaths    Loss of balance, or no ability to walk straight or stand still    Nausea and vomiting    Slurred or loud speech    Quick mood changes    Trouble at work or school, or risky behavior, such as unprotected sex or driving while intoxicated  How is alcohol intoxication treated? Your healthcare provider will ask about your use of alcohol. These questions may include how much, how often, and what kind of alcohol you drink. He or she may test your memory. Blood or urine samples may be tested for alcohol and for signs of liver, kidney, or heart damage. Treatment may include any of the following:    Medicines may be given to help you stay calm, control seizures, or prevent nausea and vomiting. You may also be given glucose or vitamin B1 if your levels are too low.    In brief intervention therapy, a healthcare provider helps you think about your alcohol use differently. He or she helps you set goals to decrease the amount of alcohol you drink. Therapy may continue after you leave the hospital.    Extra oxygen may be given if you are so intoxicated that you cannot breathe well on your own.  Where can I find more information?    Alcoholics Anonymous  Web Address: http://www.aa.org  Substance Abuse and Mental Health Services Administration (SAMHSA)  [address]  [place] [address]  Web Address: http://www.samhsa.gov or https://dpt2.samhsa.gov/treatment/  Call your local emergency number (911 in the [place]) if:    You have sudden trouble breathing or chest pain.    You have a seizure.    You feel sad enough to harm yourself or others.  When should I call my doctor?    You have hallucinations (you see or hear things that are not real).    You cannot stop vomiting.    You have questions or concerns about your condition or care.  CARE AGREEMENT:    You have the right to help plan your care. Learn about your health condition and how it may be treated. Discuss treatment options with your healthcare providers to decide what care you want to receive. You always have the right to refuse treatment.

## 2025-03-23 NOTE — ED STATDOCS - CARE PROVIDER_API CALL
Pancho Walls  Cardiovascular Disease  [address], [address]  [place] [address]  Phone: [phone]  Fax: [phone]  Follow Up Time:

## 2025-03-23 NOTE — ED PROVIDER NOTE - CONSTITUTIONAL, MLM
normal... Pt is tearful, awake, alert, oriented to person, place, time/situation. Pt is hesitant to answer questions and is a poor historian
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
## 2025-03-23 NOTE — ED PROVIDER NOTE - PATIENT PORTAL LINK FT
You can access the FollowMyHealth Patient Portal offered by [facility] by registering at the following website: [email or web address]. By joining [employer]’s FollowMyHealth portal, you will also be able to view your health information using other applications (apps) compatible with our system.
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
## 2025-03-23 NOTE — ED ADULT NURSE NOTE - NSFALLRISKINTERV_ED_ALL_ED
Assistance OOB with selected safe patient handling equipment if applicable/Assistance with ambulation/Communicate fall risk and risk factors to all staff, patient, and family/Monitor gait and stability/Monitor for mental status changes and reorient to person, place, and time, as needed/Provide visual cue: yellow wristband, yellow gown, etc/Reinforce activity limits and safety measures with patient and family/Toileting schedule using arm’s reach rule for commode and bathroom/Use of alarms - bed, stretcher, chair and/or video monitoring/Call bell, personal items and telephone in reach/Instruct patient to call for assistance before getting out of bed/chair/stretcher/Non-slip footwear applied when patient is off stretcher/[place] to call system/Physically safe environment - no spills, clutter or unnecessary equipment/Purposeful Proactive Rounding/Room/bathroom lighting operational, light cord in reach

## 2025-03-23 NOTE — ED PROVIDER NOTE - NSFOLLOWUPINSTRUCTIONS_ED_ALL_ED_FT
Alcohol Intoxication    WHAT YOU NEED TO KNOW:    What is alcohol intoxication? Alcohol intoxication is a harmful physical condition caused when you drink more alcohol than your body can handle. It is also called ethanol poisoning, or being drunk.    What do I need to know about recommended alcohol limits?    Men 21 to 64 years should limit alcohol to 2 drinks a day. Do not have more than 4 drinks in 1 day or more than 14 in 1 week.    All women, and men 65 or older should limit alcohol to 1 drink in a day. Do not have more than 3 drinks in 1 day or more than 7 in 1 week. No amount of alcohol is okay during pregnancy.  What are common signs and symptoms of alcohol intoxication?    Breath that smells like alcohol    Blackouts or seizures    Enlarged pupils, or eye movements that are faster than normal for you    Fast heartbeat and slow breaths    Loss of balance, or no ability to walk straight or stand still    Nausea and vomiting    Slurred or loud speech    Quick mood changes    Trouble at work or school, or risky behavior, such as unprotected sex or driving while intoxicated  How is alcohol intoxication treated? Your healthcare provider will ask about your use of alcohol. These questions may include how much, how often, and what kind of alcohol you drink. He or she may test your memory. Blood or urine samples may be tested for alcohol and for signs of liver, kidney, or heart damage. Treatment may include any of the following:    Medicines may be given to help you stay calm, control seizures, or prevent nausea and vomiting. You may also be given glucose or vitamin B1 if your levels are too low.    In brief intervention therapy, a healthcare provider helps you think about your alcohol use differently. He or she helps you set goals to decrease the amount of alcohol you drink. Therapy may continue after you leave the hospital.    Extra oxygen may be given if you are so intoxicated that you cannot breathe well on your own.  Where can I find more information?    Alcoholics Anonymous  Web Address: http://www.aa.org  Substance Abuse and Mental Health Services Administration (SAMHSA)  [address]  [place] [address]  Web Address: http://www.samhsa.gov or https://dpt2.samhsa.gov/treatment/  Call your local emergency number (911 in the [place]) if:    You have sudden trouble breathing or chest pain.    You have a seizure.    You feel sad enough to harm yourself or others.  When should I call my doctor?    You have hallucinations (you see or hear things that are not real).    You cannot stop vomiting.    You have questions or concerns about your condition or care.  CARE AGREEMENT:    You have the right to help plan your care. Learn about your health condition and how it may be treated. Discuss treatment options with your healthcare providers to decide what care you want to receive. You always have the right to refuse treatment.

## 2025-03-23 NOTE — ED PROVIDER NOTE - PROGRESS NOTE DETAILS
ETOH 423.  Pt. alert and awake.  Son in room.  Will continue to monitor.  Janell Cazares PA-C Pt. able to ambulate in ED.  Alert and awake.  Requesting discharge. Family in ED to DC home.  I asked her if she would like information on Rehab for ETOH and she deferred.  Janell Cazares PA-C

## 2025-03-23 NOTE — ED PROVIDER NOTE - DISCHARGE DATE
Peripheral Nerve Block Patient Preparation  Location: Holding  Preanesthetic Checklist:  Patient Identified, Monitors and Equipment Checked, Timeout Performed, Post-Op Pain Mgt at Surgeon Request, Site Marked, Patient Hemodynamically Stable and Risks and Benefits Discussed  Patient Position:  Supine  Sedation:  Midazolam  2  Monitor(s) Used:  EKG  Oxygen Supplement:  Nasal Cannula  Site Prep:  Sterile Gloves, Cap, Mask and Chloroprep  Requesting Surgeon: courtney      Peripheral Nerve Block Details  Peripheral Nerve Block Type:  Adductor Canal, Ultrasound Guided  Block Designation: Right  Local Infiltration:  1% Lidocaine  Local Infiltration Volume:  2mL  Needle Type:  Touhy  Stimulating Needle:  No    Needle Gauge:  21 G  Needle Length:  8cm  Needle Localization: Ultrasound Guidance     Local Anesthetic:  Ropivacaine  Local Anesthetic Concentration:  0.2%  Local Anesthetic Volume:  40mL  Epinephrine:  None  Aspiration:  Negative  Incremental Injection:  Yes  Paresthesias:  No  Pain on Injection:  No  Catheter Used:  No    Peripheral Nerve Block Note  Surgeon requests block for post-op pain control.  Risks, benefits, and alternatives to Adductor Canal Block was addressed with the patient and the procedure was described. The patient wishes to proceed.    Procedure was done utilizing continual US guidance and visualization of needle.  Block was performed on the right.   Patient was monitored, awake and appropriate throughout.  Procedure performed with 22 gauge  8cm SonoTap needle  Aspiration from the needle was negative for heme prior to injection of Local anesthetic.Local anesthetic spread was visualized with continuous US.    There were no apparent complications and the patient tolerated the procedure well.  US picture placed on patient's chart or storage device.                   24-Mar-2025

## 2025-03-23 NOTE — ED ADULT NURSE REASSESSMENT NOTE - NS ED NURSE REASSESS COMMENT FT1
pt family concerned for pts drinking problem and thinks she might hurt herself. pt denies SI/HI at this time. md jimenez aware. will reassess situation when pt landon up

## 2025-03-23 NOTE — ED PROVIDER NOTE - CLINICAL SUMMARY MEDICAL DECISION MAKING FREE TEXT BOX
Plan for labs, IV fluids, and social work consult. Patient with uncomplicated alcohol intoxication.  Lab demonstrate CBC within normal limits, CMP within normal limits, alcohol 423, U-Tox negative.  Patient observed in the ED until clinical sobriety.  On reexam patient without any other acute complaints, requesting discharge home.  No SI, HI or hallucinations.  No signs of withdrawal.  Patient able ambulate without ataxia.  She has no signs of trauma.  Okay for discharge home this time recommend close outpatient follow-up.  Educated and given resources on alcohol abuse.  Strict return precautions given any worsening.  Patient verbalized understanding and agreed with plan.

## 2025-03-23 NOTE — ED STATDOCS - PATIENT PORTAL LINK FT
You can access the FollowMyHealth Patient Portal offered by  by registering at the following website: [email or web address]. By joining [employer]’s FollowMyHealth portal, you will also be able to view your health information using other applications (apps) compatible with our system.

## 2025-05-20 ENCOUNTER — APPOINTMENT (OUTPATIENT)
Dept: VASCULAR SURGERY | Facility: CLINIC | Age: 50
End: 2025-05-20

## 2025-06-10 ENCOUNTER — APPOINTMENT (OUTPATIENT)
Dept: VASCULAR SURGERY | Facility: CLINIC | Age: 50
End: 2025-06-10
Payer: COMMERCIAL

## 2025-06-10 VITALS
HEIGHT: 68 IN | OXYGEN SATURATION: 99 % | DIASTOLIC BLOOD PRESSURE: 89 MMHG | SYSTOLIC BLOOD PRESSURE: 163 MMHG | WEIGHT: 135 LBS | HEART RATE: 88 BPM | BODY MASS INDEX: 20.46 KG/M2

## 2025-06-10 PROCEDURE — 93970 EXTREMITY STUDY: CPT

## 2025-06-10 PROCEDURE — 99204 OFFICE O/P NEW MOD 45 MIN: CPT

## 2025-06-10 RX ORDER — METOPROLOL TARTRATE 75 MG/1
TABLET, FILM COATED ORAL
Refills: 0 | Status: ACTIVE | COMMUNITY